# Patient Record
Sex: FEMALE | Race: WHITE | Employment: UNEMPLOYED | URBAN - METROPOLITAN AREA
[De-identification: names, ages, dates, MRNs, and addresses within clinical notes are randomized per-mention and may not be internally consistent; named-entity substitution may affect disease eponyms.]

---

## 2020-10-05 ENCOUNTER — OFFICE VISIT (OUTPATIENT)
Dept: FAMILY MEDICINE CLINIC | Age: 40
End: 2020-10-05
Payer: COMMERCIAL

## 2020-10-05 VITALS
RESPIRATION RATE: 12 BRPM | OXYGEN SATURATION: 97 % | HEIGHT: 65 IN | WEIGHT: 166 LBS | BODY MASS INDEX: 27.66 KG/M2 | HEART RATE: 79 BPM

## 2020-10-05 PROBLEM — I95.9 HYPOTENSION: Status: ACTIVE | Noted: 2020-10-05

## 2020-10-05 PROBLEM — R76.8 HCV ANTIBODY POSITIVE: Status: ACTIVE | Noted: 2020-10-05

## 2020-10-05 PROBLEM — K21.9 GASTROESOPHAGEAL REFLUX DISEASE: Status: ACTIVE | Noted: 2020-10-05

## 2020-10-05 PROBLEM — L81.9 HYPOPIGMENTATION: Status: ACTIVE | Noted: 2020-10-05

## 2020-10-05 PROBLEM — F39 MOOD DISORDER (HCC): Status: ACTIVE | Noted: 2020-10-05

## 2020-10-05 PROBLEM — N92.6 IRREGULAR MENSES: Status: ACTIVE | Noted: 2020-10-05

## 2020-10-05 PROBLEM — N64.4 MASTALGIA: Status: ACTIVE | Noted: 2020-10-05

## 2020-10-05 PROBLEM — F17.210 CIGARETTE NICOTINE DEPENDENCE WITHOUT COMPLICATION: Status: ACTIVE | Noted: 2020-10-05

## 2020-10-05 PROBLEM — Z12.4 SCREENING FOR CERVICAL CANCER: Status: ACTIVE | Noted: 2020-10-05

## 2020-10-05 PROCEDURE — 99204 OFFICE O/P NEW MOD 45 MIN: CPT | Performed by: INTERNAL MEDICINE

## 2020-10-05 RX ORDER — HYDROXYZINE PAMOATE 100 MG/1
100 CAPSULE ORAL EVERY 8 HOURS
COMMUNITY
Start: 2019-12-30

## 2020-10-05 RX ORDER — FAMOTIDINE 20 MG/1
20 TABLET, FILM COATED ORAL 2 TIMES DAILY
Qty: 60 TABLET | Refills: 5 | Status: SHIPPED | OUTPATIENT
Start: 2020-10-05 | End: 2020-10-05 | Stop reason: SDUPTHER

## 2020-10-05 RX ORDER — FAMOTIDINE 20 MG/1
20 TABLET, FILM COATED ORAL 2 TIMES DAILY
COMMUNITY
End: 2020-10-05 | Stop reason: SDUPTHER

## 2020-10-05 RX ORDER — FAMOTIDINE 20 MG/1
20 TABLET, FILM COATED ORAL 2 TIMES DAILY
Qty: 60 TABLET | Refills: 5 | Status: SHIPPED | OUTPATIENT
Start: 2020-10-05 | End: 2021-06-21

## 2020-10-05 RX ORDER — CLONIDINE HYDROCHLORIDE 0.2 MG/1
0.2 TABLET ORAL 2 TIMES DAILY
COMMUNITY

## 2020-10-05 ASSESSMENT — PATIENT HEALTH QUESTIONNAIRE - PHQ9
1. LITTLE INTEREST OR PLEASURE IN DOING THINGS: 0
2. FEELING DOWN, DEPRESSED OR HOPELESS: 0
SUM OF ALL RESPONSES TO PHQ QUESTIONS 1-9: 0
SUM OF ALL RESPONSES TO PHQ9 QUESTIONS 1 & 2: 0
SUM OF ALL RESPONSES TO PHQ QUESTIONS 1-9: 0

## 2020-10-05 NOTE — PATIENT INSTRUCTIONS
I recommend the pneumococcal, TDA P , and flu vaccine. Think about smoking cessation. Decrease clonidine to 0.1 twice daily. May use half a pill. Let your psychiatrist know about your lightheadedness and low blood pressure. Please have your other laboratory sent to us at [phone]. Call for your ultrasound and mammogram of your breast    I am not sure that your rash is tinea versicolor. We will wait and see how your liver function is prior to giving any oral medication. If it does not clear up I would recommend to following with dermatology for this.     You should have your laboratory test results back within a week

## 2020-10-05 NOTE — PROGRESS NOTES
HPI: Mehrdad Burnham presents to establish care    Health issues include diagnosis of bipolar disorder, hypopigmentation, history substance abuse, Suboxone use, hepatitis with positive hep C antibody, irregular menses, elevated BMI, history of bulimia. 2013 on Depakote and elevated liver function test.  Positive hepatitis C antibody however I do not see that she ever had a viral load completed. Has laboratories to have this performed as well as hepatitis a and B surface antibody. Occasional dark stools. No abdominal swelling. No alcohol. No longer on Depakote. No episodes of icterus. History of recreational drugs. Drug addiction. Currently going to the rehab. On Suboxone and tapering. On clonidine in Vistaril. Takes her clonidine intermittently. Took today. Does not like the way it makes her feel. Has lightheadedness and near syncopal episodes on it. Feels a bit lightheaded today. He is safe at home. Has 2 children. Mother lives with them. One child with sensory integration problems and possible autism.  therapeutic  for possible placenta previa  HX abnormal pap,  Irregular menses. No mammograms. Left breast pain and questionable mass. No family history of breast ovarian or uterine cancer. Not sexually active. Denies any concerns with STDs. Does note last time she had sex she had dyspareunia and has had some discharge. Positive tobacco.  Not interested in cessation today. Declines flu and pneumonia vaccines today. General health maintenance. Needs to see dentist.  Enamel loss secondary history of bulimia. Does have an eye doctor. PMH:  Hospitalization , ,    SH lives with mother 2/3 children ( 15,12 ) child with sensory integration problems. + tobacco 1/2 ppd. No alcohol. MJ, recent move. And active. FH: 1 sister   + DM    - colon, breast or ovarian, no known heart disease. Review of systems: Remote concussion.   Substance abuse in remission. Poor dentition. Occasional sinus symptoms. Tobacco addiction. Denies pulmonary function test.  Has had an inhaler remotely none currently. Not interested in smoking cessation currently. Denies any chest pain palpitations. Mastalgia on the left. Positive GE reflux. Some dark stools. No kidney stones recurrent bladder infections. Positive vaginitis. Dyspareunia. History abnormal Pap in the past.  Treated with procedure. Hypopigmentation's chest and left side of her neck being treated with [facility] without improvement.       Constitutional, ent, CV, respiratory, GI, , joint, skin, allergic and psychiatric ROS reviewed and negative except for above    Allergies   Allergen Reactions    Norco [Hydrocodone-Acetaminophen] Other (See Comments)    Tramadol      \"felt weird\"       Outpatient Medications Marked as Taking for the 10/5/20 encounter (Office Visit) with Calvin Garcia MD   Medication Sig Dispense Refill    cloNIDine (CATAPRES) 0.2 MG tablet Take 0.2 mg by mouth 2 times daily      hydrOXYzine (VISTARIL) 100 MG capsule Take 100 mg by mouth every 8 hours      Buprenorphine HCl-Naloxone HCl (SUBOXONE SL) Place under the tongue         Over-the-counter Pepcid in the past      Past Medical History:   Diagnosis Date    Abnormal Pap smear     dysplasia    Anemia     Anxiety     Bipolar 1 disorder (HCC)     Depression     Hepatitis C     Kidney infection     Mental disorder     anxiety,  bipolar disorder    Miscarriage     PID (acute pelvic inflammatory disease)     UTI (lower urinary tract infection)     UTI (lower urinary tract infection)        Past Surgical History:   Procedure Laterality Date     SECTION      ,,     SECTION      DILATION AND CURETTAGE OF UTERUS      INDUCED                Family History   Problem Relation Age of Onset    Arthritis Mother     Diabetes Father     High Blood Pressure Father     Diabetes Maternal found for: LABA1C  No results found for: EAG  No results found for: LABA1C  No components found for: CHLPL  No results found for: TRIG  No results found for: HDL  No results found for: LDLCALC  No results found for: LABVLDL    Old labs and records reviewed or requested  Discussed past lab and studies with patient      Diagnosis Orders   1. Hypotension, unspecified hypotension type     2. Screening, lipid  Lipid Panel   3. Screening for cervical cancer  PAP SMEAR   4. Encounter for vitamin deficiency screening  Vitamin B12   5. Irregular menses  Vaginal Pathogens Probes *A    C.trachomatis N.gonorrhoeae DNA, Thin Prep    TSH WITH REFLEX TO FT4   6. Cigarette nicotine dependence without complication     7. Hypopigmentation     8. HCV antibody positive     9. Mood disorder ([address]     10. Mastalgia  HOSSEIN DIGITAL DIAGNOSTIC W OR WO CAD BILATERAL    US BREAST COMPLETE LEFT   11. Gastroesophageal reflux disease, unspecified whether esophagitis present  BLOOD OCCULT STOOL #1     Pretension most likely from clonidine. Orthostatic changes when she stands up at home. We will cut back her clonidine. May continue on with her his Vistaril. History abnormal Pap irregular menses. STD vaginal pathogens and stool Pap smear, thyroid function. Consider pelvic ultrasound. History of B12 use. States she has poor diet. Will get B12 level. Tobacco addiction with history of asthma. Advised against continuation of tobacco    Elevated liver functions with positive hepatitis C no viral load. This is been ordered as well as her immunity to hep a and B    Mastalgia. Ultrasound diagnosed as mammogram    Substance abuse in remission on Suboxone. Continue on with her specialty care    Mood disorder. Continue on with her Vistaril. Possibility of referral to psych PC when she leaves her current psychiatric practice. She needs to use them secondary to court order    Dark stool. Will need to send her a FIT card GE reflux.   Pepcid prescription given      No follow-ups on file. Diagnosis and treatment discussed.   Possible side effects of medication reviewed  Patients questions answered  Follow up understood  Pt aware if they are not contacted about any test results , this does not mean they are normal.  They should call

## 2020-10-06 LAB
C. TRACHOMATIS DNA,THIN PREP: NEGATIVE
CANDIDA SPECIES, DNA PROBE: ABNORMAL
GARDNERELLA VAGINALIS, DNA PROBE: ABNORMAL
N. GONORRHOEAE DNA, THIN PREP: NEGATIVE
TRICHOMONAS VAGINALIS DNA: ABNORMAL

## 2020-10-07 LAB
HPV COMMENT: NORMAL
HPV TYPE 16: NOT DETECTED
HPV TYPE 18: NOT DETECTED
HPVOH (OTHER TYPES): NOT DETECTED

## 2020-10-11 RX ORDER — METRONIDAZOLE 500 MG/1
500 TABLET ORAL 2 TIMES DAILY
Qty: 14 TABLET | Refills: 0 | Status: SHIPPED | OUTPATIENT
Start: 2020-10-11 | End: 2020-10-18

## 2020-10-14 LAB
A/G RATIO: NORMAL
ALBUMIN SERPL-MCNC: 4.8 G/DL
ALP BLD-CCNC: 64 U/L
ALT SERPL-CCNC: 16 U/L
AST SERPL-CCNC: 21 U/L
BILIRUB SERPL-MCNC: 0.3 MG/DL (ref 0.1–1.4)
BILIRUBIN DIRECT: 0.1 MG/DL
BILIRUBIN, INDIRECT: NORMAL
BUN BLDV-MCNC: 12 MG/DL
CALCIUM SERPL-MCNC: 9.9 MG/DL
CHLORIDE BLD-SCNC: 102 MMOL/L
CHOLESTEROL, TOTAL: 204 MG/DL
CHOLESTEROL/HDL RATIO: NORMAL
CO2: 21 MMOL/L
CREAT SERPL-MCNC: 0.91 MG/DL
GFR CALCULATED: NORMAL
GLOBULIN: NORMAL
GLUCOSE BLD-MCNC: 98 MG/DL
HDLC SERPL-MCNC: 38 MG/DL (ref 35–70)
LDL CHOLESTEROL CALCULATED: 108 MG/DL (ref 0–160)
NONHDLC SERPL-MCNC: NORMAL MG/DL
POTASSIUM SERPL-SCNC: 4.8 MMOL/L
PROTEIN TOTAL: 7.9 G/DL
SODIUM BLD-SCNC: 138 MMOL/L
TRIGL SERPL-MCNC: 338 MG/DL
VLDLC SERPL CALC-MCNC: 58 MG/DL

## 2020-10-20 ENCOUNTER — TELEPHONE (OUTPATIENT)
Dept: FAMILY MEDICINE CLINIC | Age: 40
End: 2020-10-20

## 2020-10-20 NOTE — TELEPHONE ENCOUNTER
PT called in regarding her lab results. Informed PT of Dr. Kimberlyn Quiñonez note and that medication was sent to her pharmacy.

## 2020-11-04 PROBLEM — Z12.4 SCREENING FOR CERVICAL CANCER: Status: RESOLVED | Noted: 2020-10-05 | Resolved: 2020-11-04

## 2020-11-12 ENCOUNTER — TELEPHONE (OUTPATIENT)
Dept: FAMILY MEDICINE CLINIC | Age: 40
End: 2020-11-12

## 2020-11-12 NOTE — TELEPHONE ENCOUNTER
PT called in wanting to know if Dr. Anton Pina received the results of her liver test because Dr. Anton Pina was supposed to call in a medication for her based on those results.      Please call PT back: [phone]

## 2020-11-23 ENCOUNTER — HOSPITAL ENCOUNTER (OUTPATIENT)
Dept: ULTRASOUND IMAGING | Age: 40
Discharge: HOME | End: 2020-11-23
Payer: COMMERCIAL

## 2020-11-23 ENCOUNTER — HOSPITAL ENCOUNTER (OUTPATIENT)
Dept: MAMMOGRAPHY | Age: 40
Discharge: HOME | End: 2020-11-23
Payer: COMMERCIAL

## 2020-11-23 PROCEDURE — G0279 TOMOSYNTHESIS, MAMMO: HCPCS

## 2020-11-23 PROCEDURE — 76642 ULTRASOUND BREAST LIMITED: CPT

## 2020-11-25 ENCOUNTER — TELEPHONE (OUTPATIENT)
Dept: FAMILY MEDICINE CLINIC | Age: 40
End: 2020-11-25

## 2020-11-25 NOTE — TELEPHONE ENCOUNTER
Form was completed and scanned from lab [facility] for them to release other results. Per lab riri another physician ordered the liver tests and had to complete before they sent. I have faxed back to them and will await results. Pt is ready for medication to be rx'd.

## 2020-11-25 NOTE — TELEPHONE ENCOUNTER
Calling to find out if liver results were ever received so she can begin medication. Please advise.  Please call pt back at [phone]

## 2020-11-30 RX ORDER — FLUCONAZOLE 150 MG/1
TABLET ORAL
Qty: 6 TABLET | Refills: 0 | Status: SHIPPED | OUTPATIENT
Start: 2020-11-30 | End: 2021-01-25

## 2020-11-30 NOTE — TELEPHONE ENCOUNTER
Liver tests good. Can start medication ( I've forgotten which med.   I believe for fungus on skin?)  No imunity to hep B recommend vaccination  Neg hep c great

## 2021-04-20 ENCOUNTER — TELEPHONE (OUTPATIENT)
Dept: FAMILY MEDICINE CLINIC | Age: 41
End: 2021-04-20

## 2021-04-20 DIAGNOSIS — L22 DIAPER RASH: Primary | ICD-10-CM

## 2021-04-20 DIAGNOSIS — B36.9 FUNGAL RASH OF TORSO: Primary | ICD-10-CM

## 2021-04-20 DIAGNOSIS — B36.9 FUNGAL RASH OF TRUNK: ICD-10-CM

## 2021-04-20 NOTE — TELEPHONE ENCOUNTER
Pt states it was Dr Hien Sandoval. Pt states that [facility] is not accepting new pt's. Can we try [facility]? Please advise.

## 2021-04-20 NOTE — TELEPHONE ENCOUNTER
Jose hughes, please give her the information. Who gave her this diagnosis? Was this from Dr. Chelo Xiao?

## 2021-04-20 NOTE — TELEPHONE ENCOUNTER
Yes please give her information for the dermatology group, please place new referral and I will sign

## 2021-04-26 PROBLEM — F19.21 DRUG ADDICTION IN REMISSION (HCC): Status: ACTIVE | Noted: 2021-04-26

## 2021-06-21 RX ORDER — FAMOTIDINE 20 MG/1
TABLET, FILM COATED ORAL
Qty: 60 TABLET | Refills: 4 | Status: SHIPPED | OUTPATIENT
Start: 2021-06-21 | End: 2021-09-20 | Stop reason: SDUPTHER

## 2021-08-26 ENCOUNTER — OFFICE VISIT (OUTPATIENT)
Dept: FAMILY MEDICINE CLINIC | Age: 41
End: 2021-08-26
Payer: COMMERCIAL

## 2021-08-26 VITALS
SYSTOLIC BLOOD PRESSURE: 123 MMHG | RESPIRATION RATE: 16 BRPM | DIASTOLIC BLOOD PRESSURE: 83 MMHG | OXYGEN SATURATION: 97 % | HEART RATE: 80 BPM | HEIGHT: 65 IN | WEIGHT: 164 LBS | BODY MASS INDEX: 27.32 KG/M2

## 2021-08-26 DIAGNOSIS — K08.9 DENTAL DISEASE: ICD-10-CM

## 2021-08-26 DIAGNOSIS — R76.8 HCV ANTIBODY POSITIVE: ICD-10-CM

## 2021-08-26 DIAGNOSIS — F19.21 DRUG ADDICTION IN REMISSION (HCC): ICD-10-CM

## 2021-08-26 DIAGNOSIS — H02.9 EYELID ABNORMALITY: ICD-10-CM

## 2021-08-26 DIAGNOSIS — H01.001 BLEPHARITIS OF UPPER EYELIDS OF BOTH EYES, UNSPECIFIED TYPE: ICD-10-CM

## 2021-08-26 DIAGNOSIS — R10.13 EPIGASTRIC PAIN: Primary | ICD-10-CM

## 2021-08-26 DIAGNOSIS — H01.004 BLEPHARITIS OF UPPER EYELIDS OF BOTH EYES, UNSPECIFIED TYPE: ICD-10-CM

## 2021-08-26 PROBLEM — F17.210 CIGARETTE NICOTINE DEPENDENCE WITHOUT COMPLICATION: Status: RESOLVED | Noted: 2020-10-05 | Resolved: 2021-08-26

## 2021-08-26 PROBLEM — I95.9 HYPOTENSION: Status: RESOLVED | Noted: 2020-10-05 | Resolved: 2021-08-26

## 2021-08-26 PROCEDURE — G8419 CALC BMI OUT NRM PARAM NOF/U: HCPCS | Performed by: INTERNAL MEDICINE

## 2021-08-26 PROCEDURE — G8427 DOCREV CUR MEDS BY ELIG CLIN: HCPCS | Performed by: INTERNAL MEDICINE

## 2021-08-26 PROCEDURE — 99214 OFFICE O/P EST MOD 30 MIN: CPT | Performed by: INTERNAL MEDICINE

## 2021-08-26 PROCEDURE — 4004F PT TOBACCO SCREEN RCVD TLK: CPT | Performed by: INTERNAL MEDICINE

## 2021-08-26 RX ORDER — HYOSCYAMINE SULFATE 0.12 MG/1
TABLET SUBLINGUAL
Qty: 60 EACH | Refills: 0 | Status: SHIPPED | OUTPATIENT
Start: 2021-08-26 | End: 2021-09-27

## 2021-08-26 RX ORDER — AMOXICILLIN 875 MG/1
TABLET, COATED ORAL
COMMUNITY
Start: 2021-08-23 | End: 2021-10-25

## 2021-08-26 ASSESSMENT — PATIENT HEALTH QUESTIONNAIRE - PHQ9
2. FEELING DOWN, DEPRESSED OR HOPELESS: 0
SUM OF ALL RESPONSES TO PHQ QUESTIONS 1-9: 0
SUM OF ALL RESPONSES TO PHQ QUESTIONS 1-9: 0
SUM OF ALL RESPONSES TO PHQ9 QUESTIONS 1 & 2: 0
1. LITTLE INTEREST OR PLEASURE IN DOING THINGS: 0
SUM OF ALL RESPONSES TO PHQ QUESTIONS 1-9: 0

## 2021-08-26 NOTE — PATIENT INSTRUCTIONS
Oral And Maxillofacial Surgery  [address] [place], [place], [facility], [address] other location  [phone]    [facility] ([address])  [address], [address] [place] [address]  Phone: [phone]    Call above for oral surgery. I recommend getting Covid vaccine. Please take these laboratory request for your next appointment. Other vaccines that are reasonable include the pneumonia vaccine and tetanus vaccine  Happy birthday.   Nice job not smoking

## 2021-08-26 NOTE — PROGRESS NOTES
HPI: Canelo Phillip presents for abdominal discomfort, dental disease      Health issues include diagnosis of bipolar disorder, hypopigmentation, history substance abuse, Suboxone use, false positive hepatitis C negative viral load, irregular menses, elevated BMI, history of bulimia, dental disease    Over the last year she has had intermittent swelling and hardness of her upper epigastrium. No vomiting associated with this. Does not feel like she is constipated stools does not make it better. Occur several times a week. Has some massage for it. Has not used any laxatives or other medicine for spell. Is unaware of any triggers. Does not feel there is anything that makes it better. Is on H2 blocker chronically. CT abdomens  with normal spleen, pancreas, adrenal glands, kidneys and contracted gallbladder. As of diverticuli and ovarian cyst.  Patient denies constipation. No fevers or chills with this. It has been steady. No weight loss. No sweats      on Depakote and elevated liver function test.  Positive hepatitis C antibody by negative viral load 3/3/2021 negative hepatitis B antigen.      Drug addiction. Currently going to the rehab. On Suboxone and tapering. On clonidine in Vistaril. .  Has 2 children. Is clean currently. Mother lives with them. One child with sensory integration problems and possible autism.      therapeutic  for possible placenta previa  HX abnormal pap,  Irregular menses. No family history of breast ovarian or uterine cancer. Not sexually active. Negative cotesting 2020 I read 1 mammogram 2020     Tobacco cessation. Vapes. No flu, pneumonia, or Covid vaccines. Ental disease. Broken teeth. Enamel loss when she had bulimia. Needs extractions.   Continue to break     Specialists:  subaxone clinic  [facility]       PMH:  Hospitalization , ,     SH lives with mother 2/3 children ( 15,12 ) child with sensory integration problems. 1 vape q 2 1/2 weeks, . No alcohol. MJ,       FH: 1 sister   + DM    - colon, breast or ovarian, no known heart disease.     Review of systems: Remote concussion. Substance abuse in remission. Poor dentition. Occasional sinus symptoms. Tobacco addiction. Denies pulmonary function test.  Has had an inhaler remotely none currently. Not interested in smoking cessation currently. Has a patch. No hemoptysis. Denies any chest pain palpitations. Rare GE reflux. Has constipation. No kidney stones recurrent bladder infections. Positive vaginitis. Dyspareunia. History abnormal Pap in the past.  Treated with procedure.   Hypopigmentation's chest and left side of her neck being treated with Selsun Blue without improvement.     Constitutional, ent, CV, respiratory, GI, , joint, skin, allergic and psychiatric ROS reviewed and negative except for above    Allergies   Allergen Reactions    Norco [Hydrocodone-Acetaminophen] Other (See Comments)    Tramadol      \"felt weird\"       Outpatient Medications Marked as Taking for the 8/26/21 encounter (Office Visit) with Fer Moses MD   Medication Sig Dispense Refill    amoxicillin (AMOXIL) 875 MG tablet       famotidine (PEPCID) 20 MG tablet TAKE ONE TABLET BY MOUTH TWICE A DAY 60 tablet 4    selenium sulfide (SELSUN BLUE) 1 % shampoo Apply topically daily as needed for Itching      cloNIDine (CATAPRES) 0.2 MG tablet Take 0.2 mg by mouth 2 times daily      hydrOXYzine (VISTARIL) 100 MG capsule Take 100 mg by mouth every 8 hours      Buprenorphine HCl-Naloxone HCl (SUBOXONE SL) Place under the tongue               Past Medical History:   Diagnosis Date    Abnormal Pap smear     dysplasia    Anemia     Anxiety     Bipolar 1 disorder (HCC)     Depression     Drug addiction in remission ([facility] [address] 4/26/2021    Hepatitis C     Kidney infection     Mental disorder     anxiety,  bipolar disorder    Miscarriage     PID (acute pelvic Component Value Date    WBC 12.8 (H) 10/28/2014    HGB 14.5 10/28/2014    HCT 43.1 10/28/2014    MCV 90.9 10/28/2014     10/28/2014     No results found for: LABA1C  No results found for: EAG  No results found for: LABA1C  No components found for: CHLPL  Lab Results   Component Value Date    TRIG 338 10/14/2020     Lab Results   Component Value Date    HDL 38 10/14/2020     Lab Results   Component Value Date    LDLCALC 108 10/14/2020     No results found for: LABVLDL    Old labs and records reviewed or requested  Discussed past lab and studies with patient    Diagnosis Orders   1. Epigastric pain  CBC Auto Differential    Comprehensive Metabolic Panel   2. HCV antibody positive     3. Drug addiction in remission ([facility] [address] [address]     4. Eyelid abnormality  Ambulatory referral to Plastic Surgery   5. Dental disease     6. Blepharitis of upper eyelids of both eyes, unspecified type       Epigastric pain some bloating questionable irritable bowel. Trial of Levsin. Laboratories requested. She wishes to have these done at her Suboxone clinic she was given copy of labs to take. Dental disease. Given names of. Oral surgery is to call. Eyelid drooping and inflammation of the eye lashes. Follow-up plastic surgery for possible eyelid surgery and is it is impairing her field of vision. May use washes for blepharitis        No follow-ups on file. Diagnosis and treatment discussed.   Possible side effects of medication reviewed  Patients questions answered  Follow up understood  Pt aware if they are not contacted about any test results , this does not mean they are normal.  They should call

## 2021-08-31 ENCOUNTER — TELEPHONE (OUTPATIENT)
Dept: FAMILY MEDICINE CLINIC | Age: 41
End: 2021-08-31

## 2021-08-31 NOTE — TELEPHONE ENCOUNTER
I spoke with patient regarding appointment no show and informed patient of late cancellation/no show policy. She states she does her best to make her appointments and understands how important this is. She states sometimes she has transportation issues which can cause her to have to reschedule her appointments. Dr. Gerardo Knapp and Ehsan Reasons - The patient would like to know if she has hepatitis? She states she was reviewing her test results and she is interpreting a positive result as she has hepatitis, but states she was informed by Dr. Gerardo Knapp she does not have hepatitis. Can you reach out to the patient to clarify this test result/status for her? Thank you.

## 2021-09-20 ENCOUNTER — TELEPHONE (OUTPATIENT)
Dept: FAMILY MEDICINE CLINIC | Age: 41
End: 2021-09-20

## 2021-09-20 RX ORDER — MELOXICAM 7.5 MG/1
TABLET ORAL
Qty: 60 TABLET | Refills: 0 | Status: SHIPPED | OUTPATIENT
Start: 2021-09-20 | End: 2021-10-18

## 2021-09-20 RX ORDER — FAMOTIDINE 20 MG/1
TABLET, FILM COATED ORAL
Qty: 60 TABLET | Refills: 5 | Status: SHIPPED | OUTPATIENT
Start: 2021-09-20 | End: 2022-07-18

## 2021-09-20 NOTE — TELEPHONE ENCOUNTER
Requesting refill on generic pepcid. Also was wanting refill on ibuprofen, but wasn't sure if you were the prescribing doctor.  Please call into [facility] [phone]

## 2021-09-20 NOTE — TELEPHONE ENCOUNTER
I have refilled pepcid, but don't recommend motrin. Alternative that I would prescribe would be mobic as not as difficult on stomach.   Will order if she agrees

## 2021-09-30 ENCOUNTER — TELEPHONE (OUTPATIENT)
Dept: FAMILY MEDICINE CLINIC | Age: 41
End: 2021-09-30

## 2021-09-30 NOTE — TELEPHONE ENCOUNTER
Liliane from [address] [place] [address] called in asking to have a call back from Dr. Sonali Rivera in regards to Lab orders she believes should be done on Pt. Fidelina direct line is [phone].

## 2021-10-18 RX ORDER — MELOXICAM 7.5 MG/1
TABLET ORAL
Qty: 60 TABLET | Refills: 2 | Status: SHIPPED | OUTPATIENT
Start: 2021-10-18 | End: 2021-10-25

## 2021-10-25 ENCOUNTER — OFFICE VISIT (OUTPATIENT)
Dept: FAMILY MEDICINE CLINIC | Age: 41
End: 2021-10-25
Payer: COMMERCIAL

## 2021-10-25 ENCOUNTER — OFFICE VISIT (OUTPATIENT)
Dept: SURGERY | Age: 41
End: 2021-10-25
Payer: COMMERCIAL

## 2021-10-25 VITALS
DIASTOLIC BLOOD PRESSURE: 72 MMHG | HEART RATE: 64 BPM | HEIGHT: 65 IN | BODY MASS INDEX: 27.82 KG/M2 | WEIGHT: 167 LBS | TEMPERATURE: 98.4 F | SYSTOLIC BLOOD PRESSURE: 105 MMHG

## 2021-10-25 VITALS
BODY MASS INDEX: 27.82 KG/M2 | SYSTOLIC BLOOD PRESSURE: 91 MMHG | OXYGEN SATURATION: 98 % | HEART RATE: 56 BPM | HEIGHT: 65 IN | WEIGHT: 167 LBS | DIASTOLIC BLOOD PRESSURE: 69 MMHG | RESPIRATION RATE: 12 BRPM

## 2021-10-25 DIAGNOSIS — L81.9 HYPOPIGMENTATION: ICD-10-CM

## 2021-10-25 DIAGNOSIS — H02.831 DERMATOCHALASIS OF BOTH UPPER EYELIDS: Primary | ICD-10-CM

## 2021-10-25 DIAGNOSIS — Z23 NEED FOR INFLUENZA VACCINATION: ICD-10-CM

## 2021-10-25 DIAGNOSIS — L30.9 DERMATITIS: Primary | ICD-10-CM

## 2021-10-25 DIAGNOSIS — R60.9 EDEMA, UNSPECIFIED TYPE: ICD-10-CM

## 2021-10-25 DIAGNOSIS — H02.834 DERMATOCHALASIS OF BOTH UPPER EYELIDS: Primary | ICD-10-CM

## 2021-10-25 DIAGNOSIS — N92.6 MENSTRUAL DISORDER: ICD-10-CM

## 2021-10-25 PROCEDURE — 90674 CCIIV4 VAC NO PRSV 0.5 ML IM: CPT | Performed by: INTERNAL MEDICINE

## 2021-10-25 PROCEDURE — 99214 OFFICE O/P EST MOD 30 MIN: CPT | Performed by: INTERNAL MEDICINE

## 2021-10-25 PROCEDURE — G8419 CALC BMI OUT NRM PARAM NOF/U: HCPCS | Performed by: SURGERY

## 2021-10-25 PROCEDURE — 4004F PT TOBACCO SCREEN RCVD TLK: CPT | Performed by: SURGERY

## 2021-10-25 PROCEDURE — G8427 DOCREV CUR MEDS BY ELIG CLIN: HCPCS | Performed by: SURGERY

## 2021-10-25 PROCEDURE — 4004F PT TOBACCO SCREEN RCVD TLK: CPT | Performed by: INTERNAL MEDICINE

## 2021-10-25 PROCEDURE — G8419 CALC BMI OUT NRM PARAM NOF/U: HCPCS | Performed by: INTERNAL MEDICINE

## 2021-10-25 PROCEDURE — G8482 FLU IMMUNIZE ORDER/ADMIN: HCPCS | Performed by: INTERNAL MEDICINE

## 2021-10-25 PROCEDURE — G8427 DOCREV CUR MEDS BY ELIG CLIN: HCPCS | Performed by: INTERNAL MEDICINE

## 2021-10-25 PROCEDURE — G8482 FLU IMMUNIZE ORDER/ADMIN: HCPCS | Performed by: SURGERY

## 2021-10-25 PROCEDURE — 99204 OFFICE O/P NEW MOD 45 MIN: CPT | Performed by: SURGERY

## 2021-10-25 RX ORDER — FLUCONAZOLE 150 MG/1
TABLET ORAL
Qty: 6 TABLET | Refills: 0 | Status: SHIPPED | OUTPATIENT
Start: 2021-10-25 | End: 2022-03-28

## 2021-10-25 NOTE — PATIENT INSTRUCTIONS
[address] [address]  Ph     Call for dermatology  If not resolved in 3 weeks, I think this is not a fungus     Call for gyn apt     We should have your labs back within 6 days  Continue fluid    I recommend covid, pneumonia vaccine if you have not had.

## 2021-10-25 NOTE — Clinical Note
Super nice young woman, will refer to [facility] as she will need visual field testing to confirm diagnosis for insurance. If she wants all of her care there, not a problem. Available if she would like to come back as well. Thanks!  Manuel Sales

## 2021-10-25 NOTE — PROGRESS NOTES
HPI: Duyen Adam presents for hypopigmentation.     Health issues include diagnosis of bipolar disorder, hypopigmentation, history substance abuse, Suboxone use, false positive hepatitis C negative viral load, irregular menses, elevated BMI, history of bulimia, dental disease    Hypopigmentation left side of the neck and chest.  Some progression over extremities. Was on Diflucan and [facility] with some improvement. Has not had a biopsy. No known thyroid disease or B12 deficiency. No family history of vitiligo. Is interested in completing course for tinea versicolor as she was unable to follow through with previously    Elevated liver function test  on Depakote. False positive hepatitis C negative hepatitis B. Immunity to a but not to be. Repeat liver function test are normal.    Drug addiction in remission. Rehab. Suboxone. Clonidine and Vistaril. 1 child with autism. Has a . Notes irregular menses. Some menorrhagia. . Therapeutic portion for placenta previa in the past.  Remotely abnormal Pap. Irregular menses. Positive BV. No intercourse over 2 months. No family history of GYN cancers. Negative cotesting . BI-RADS mammogram 2020 of 1        Tobacco cessation. Vapes. No flu, pneumonia, or Covid vaccines.     dental disease. Broken teeth. Enamel loss when she had bulimia. Needs extractions. 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 Has not yet had visual field testing. Has seen plastic surgery         Specialists:  subaxone clinic  [facility]        PMH:  Hospitalization , ,     SH lives with mother 2/3 children ( 15,12 ) child with sensory integration problems. 1 vape letter alcohol     FH: 1 sister   + DM, substance abuse    - colon, breast or ovarian, no known heart disease.     Review of systems: Remote concussion.  Substance abuse in remission.  Poor dentition.   Bulimia occasional sinus symptoms.   Tobacco addiction.  Denies pulmonary function test.  Has had an inhaler remotely none currently.  Not interested in smoking cessation currently. Has a patch. No hemoptysis. Denies any chest pain palpitations. Rare GE reflux. Has constipation. No kidney stones recurrent bladder infections.  Positive vaginitis.  Dyspareunia.  History abnormal Pap in the past.  Treated with procedure.  Hypopigmentation's chest and left side of her neck being treated with Selsun Blue without improvement.     Constitutional, ent, CV, respiratory, GI, , joint, skin, allergic and psychiatric ROS reviewed and negative except for above    Allergies   Allergen Reactions    Norco [Hydrocodone-Acetaminophen] Other (See Comments)    Tramadol      \"felt weird\"       Outpatient Medications Marked as Taking for the 10/25/21 encounter (Office Visit) with Chloe Rivera MD   Medication Sig Dispense Refill    hyoscyamine (LEVSIN/SL) 125 MCG sublingual tablet DISSOLVE ONE TABLET UNDER THE TONGUE THREE TIMES A DAY FOR ABDOMINAL SPASM 60 tablet 1    cloNIDine (CATAPRES) 0.2 MG tablet Take 0.2 mg by mouth 2 times daily      hydrOXYzine (VISTARIL) 100 MG capsule Take 100 mg by mouth every 8 hours      Buprenorphine HCl-Naloxone HCl (SUBOXONE SL) Place under the tongue       Self discontinued [facility] psychiatry        Past Medical History:   Diagnosis Date    Abnormal Pap smear     dysplasia    Anemia     Anxiety     Bipolar 1 disorder (HCC)     Depression     Drug addiction in remission ([facility] [address] 2021    Hepatitis C     Kidney infection     Mental disorder     anxiety,  bipolar disorder    Miscarriage     PID (acute pelvic inflammatory disease)     UTI (lower urinary tract infection)     UTI (lower urinary tract infection)        Past Surgical History:   Procedure Laterality Date     SECTION      ,,     SECTION      DILATION AND CURETTAGE OF UTERUS      INDUCED  Family History   Problem Relation Age of Onset    Arthritis Mother     Diabetes Father     High Blood Pressure Father     Diabetes Maternal Aunt     Diabetes Paternal Aunt     Cancer Paternal Uncle     Cancer Maternal Grandmother     Cancer Maternal Grandfather     Diabetes Paternal Grandmother     Cancer Paternal Grandfather          Objective     BP 91/69   Pulse 56   Resp 12   Ht 5' 5\" (1.651 m)   Wt 167 lb (75.8 kg)   SpO2 98% Comment: RA  BMI 27.79 kg/m²     @LASTSAO2(3)@    Wt Readings from Last 3 Encounters:   10/25/21 167 lb (75.8 kg)   08/26/21 164 lb (74.4 kg)   10/05/20 166 lb (75.3 kg)       Physical Exam     NAD alert and cooperative  HEENT: TMs unremarkable. Throat is clear. Marginal dentition. Mild ptosis. No anterior cervical adenopathy or thyromegaly noted. Lungs are clear no wheezes rales or rhonchi. Cardiovascular exam regular rate and rhythm without any murmur click. No hepatosplenomegaly or epigastric tenderness. Good pulses lower extremities. I done not distal detect any significant lower extremity edema. Dry skin. Hypopigmented macules left neck few on extremities. She is appropriate.   Well-groomed present with her     Chemistry        Component Value Date/Time     10/14/2020 0000    K 4.8 10/14/2020 0000     10/14/2020 0000    CO2 21 10/14/2020 0000    BUN 12 10/14/2020 0000    CREATININE 0.91 10/14/2020 0000        Component Value Date/Time    CALCIUM 9.9 10/14/2020 0000    ALKPHOS 64 10/14/2020 0000    AST 21 10/14/2020 0000    ALT 16 10/14/2020 0000    BILITOT 0.3 10/14/2020 0000            Lab Results   Component Value Date    WBC 12.8 (H) 10/28/2014    HGB 14.5 10/28/2014    HCT 43.1 10/28/2014    MCV 90.9 10/28/2014     10/28/2014     No results found for: LABA1C  No results found for: EAG  No results found for: LABA1C  No components found for: CHLPL  Lab Results   Component Value Date    TRIG 338 10/14/2020     Lab Results   Component Value Date    HDL 38 10/14/2020     Lab Results   Component Value Date    LDLCALC 108 10/14/2020     No results found for: LABVLDL    Old labs and records reviewed or requested  Discussed past lab and studies with patient      Diagnosis Orders   1. Dermatitis     2. Need for influenza vaccination  INFLUENZA, MDCK QUADV, 2 YRS AND OLDER, IM, PF, PREFILL SYR OR SDV, 0.5ML (FLUCELVAX QUADV, PF)   3. Hypopigmentation  Vitamin B12    TSH WITH REFLEX TO FT4   4. Menstrual disorder  AFL - Linh Jett MD, Gynecology, [facility]    CBC   5. Edema, unspecified type  COMPREHENSIVE METABOLIC PANEL     Hypopigmentation questionable tinea versicolor versus vitiligo. B12 thyroid function, basic metabolic profile. I do not believe she has Prashanth's. Furl to dermatology    Need for influenza vaccine which was given. Discussed Covid pneumonia vaccine and its as well as tetanus. Menstrual disorder. Blood count thyroid function referral GYN has had abnormal Pap in the past however most recent cotesting negative. Patient history of lower extremity edema. None of significance today. We will get basic metabolic profile and CBC. On this date I have spent 30 minutes reviewing previous notes, test results, and face to face with the patient discussing the diagnosis and plan          No follow-ups on file. Diagnosis and treatment discussed.   Possible side effects of medication reviewed  Patients questions answered  Follow up understood  Pt aware if they are not contacted about any test results , this does not mean they are normal.  They should call

## 2021-10-25 NOTE — PROGRESS NOTES
[address] [facility]    Consultation requested by: Patrick Milligan MD    CC: Anuel Hammer / tired eyes    HPI: This is a  37 y.o. female who presents to clinic with desirefor upper blepharoplasty. She notes that her vision has been compromised for the past year. Her pertinent ophthalmologic history is as described below. Glasses/Contacts:Yes:  History of dry eyes: No  History of chronic tearing: Yes  History of excessive blinking:Yes  History of itching: Yes  Prior LASIK surgery:No  Prior eye trauma:  No  Glaucoma:  No  Additional eye or periorbital surgery:  No  Thyroid disorder:No   VTE:  No    PMHx:   Past Medical History:   Diagnosis Date    Abnormal Pap smear     dysplasia    Anemia     Anxiety     Bipolar [facility] disorder ([facility]     Depression     [facility]    Hepatitis C     Kidney infection     Mental disorder     anxiety,  bipolar disorder    Miscarriage     PID (acute pelvic inflammatory disease)     UTI (lower urinary tract infection)     UTI (lower urinary tract infection)      PSHx:   Past Surgical History:   Procedure Laterality Date     SECTION      ,,     SECTION      DILATION AND CURETTAGE OF UTERUS      INDUCED        Allergies:    Allergies   Allergen Reactions    Norco [Hydrocodone-Acetaminophen] Other (See Comments)    Tramadol      \"felt weird\"     FHx: Past history of ocular conditions: None    Meds:   Current Outpatient Medications   Medication Sig Dispense Refill    hyoscyamine (LEVSIN/SL) 125 MCG sublingual tablet DISSOLVE ONE TABLET UNDER THE TONGUE THREE TIMES A DAY FOR ABDOMINAL SPASM 60 tablet 1    famotidine (PEPCID) 20 MG tablet TAKE ONE TABLET BY MOUTH TWICE A DAY 60 tablet 5    selenium sulfide (SELSUN BLUE) 1 % shampoo Apply topically daily as needed for Itching      cloNIDine (CATAPRES) 0.2 MG tablet Take 0.2 mg by mouth 2 times daily      hydrOXYzine (VISTARIL) 100 MG capsule Take 100 mg by mouth every 8 hours      Buprenorphine HCl-Naloxone HCl (SUBOXONE SL) Place under the tongue       No current facility-administered medications for this visit. 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)    ROS History obtained from the patient  General ROS: negative  Psychological ROS: negative  Ophthalmic ROS: negative  Neurological ROS: negative  ENT ROS: negative  Allergy and Immunology ROS: negative  Hematological and Lymphatic ROS: negative  Endocrine ROS: negative  Respiratory ROS: negative  Cardiovascular ROS: negative  Gastrointestinal ROS: negative  Genito-Urinary ROS: negative  Musculoskeletal ROS: negative   Skin ROS: negative    EXAM  /72   Pulse 64   Temp 98.4 °F (36.9 °C)   Ht 5' 5\" (1.651 m)   Wt 167 lb (75.8 kg)   BMI 27.79 kg/m²     GEN: NAD  FACE: Deep transverse rhytids Yes; frontalis activation present no; brows positioned  below orbital rim  ORBITAL/EYE: Upper lids  right higher than left; right brow piercing  Lid ptosis  present bilaterally  Snap test: slowly  Tear trough deformity: Yes  Vector: positive   Pupils: PERRLA  Extraocular movement: Intact  Scleral show:  on bilateral eye  Visual acuity: Satisfactory  Bell's phenomenon: Yes    IMP: 37 y.o. female with dermatochalasis who presents for discussion regarding blepharoplasty  PLAN: Would likely benefit from bilateral blepharoplasties. Will need visual field testing, thus will refer to [facility]. If she desires to move forward with blepharoplasty with us, happy to see after visual field testing confirms diagnosis. If she desires to move forward with [facility] for blephs, will be available as needed. A discussion regarding eye anatomy and its intricate relation to brows and the forehead was performed with the patient and family. The pathophysiology of periorbital aging was also performed. Clinical photos were obtained.  Additionally, discussion regarding the risks including, but not limited to: bleeding (potentially requiring transfusion or reoperation), infection, loss of vision, dry eyes, poor cosmetic outcome, scarring, revisional surgery,   VTE (DVT/PE), and death was performed. All questions were answered in a satisfactory manner.      Hema Alfredo MD  [address] [place] [address] Reconstructive Surgery  [phone]  10/25/21

## 2021-10-26 DIAGNOSIS — H02.834 DERMATOCHALASIS OF BOTH UPPER EYELIDS: Primary | ICD-10-CM

## 2021-10-26 DIAGNOSIS — H02.831 DERMATOCHALASIS OF BOTH UPPER EYELIDS: Primary | ICD-10-CM

## 2021-10-28 ENCOUNTER — TELEPHONE (OUTPATIENT)
Dept: FAMILY MEDICINE CLINIC | Age: 41
End: 2021-10-28

## 2021-10-28 DIAGNOSIS — L81.9 HYPOPIGMENTATION: Primary | ICD-10-CM

## 2021-10-28 RX ORDER — KETOCONAZOLE 20 MG/G
CREAM TOPICAL
Qty: 30 G | Refills: 1 | Status: SHIPPED | OUTPATIENT
Start: 2021-10-28 | End: 2022-03-28

## 2021-10-28 NOTE — TELEPHONE ENCOUNTER
Patient ok with keeping 1% shampoo. She was unaware of time frame, she had been using daily in shower. The ketoconazole 2% cream she got from urgent care and says was very helpful, used daily and really wants this. She never saw derm as there was a very long wait time, and another derm office didn't take insurance.

## 2021-10-28 NOTE — TELEPHONE ENCOUNTER
Our record does not show that the 2.25 strength was ever prescribed by Dr. Kunal Taveras. Find out how often she is using the shampoo. The 1% can be used a couple times per week but the 2.25% can only be used every 1-4 weeks. I also do not see that ketoconazole was ever prescribed. Dr. Kunal Taveras note mentioned a derm. Maybe she got these from them? ? She did send in fluconazole tablets. Please call to clarify.

## 2021-10-28 NOTE — TELEPHONE ENCOUNTER
Went to pharm for meds: 1) PA is needed for selenium sulfide. Was originally sent in  2.25% this time is was sent in for 1% strength. Due to strength being different PA is required. Pt is wanting to know if you will call in the higher strength. Trying to aggressively take care of the dandruff issue. Please advise. 2) was going to send in a cream for ketoconazole 2%- pharm stating they did not receive script for this. Please advise. Please contact pharm at [phone].  Pt is reachable at   [phone]    Please address today

## 2021-11-29 ENCOUNTER — TELEPHONE (OUTPATIENT)
Dept: FAMILY MEDICINE CLINIC | Age: 41
End: 2021-11-29

## 2021-11-29 NOTE — TELEPHONE ENCOUNTER
Oral And Maxillofacial Surgery  [address], [place], [address] other location  [phone]     [facility] ([address])  [facility] [address]  Phone: [phone]     Call above for oral surgery.

## 2021-11-29 NOTE — TELEPHONE ENCOUNTER
Needs referral to oral surgeon to have teeth pulled. Had to see a dentist first, which she has done today. Needing to know who you had referred her to see so the referral from the dentist can be sent to the oral surgeon. Please advise.  Pt is reachable at [phone]

## 2022-01-27 ENCOUNTER — TELEPHONE (OUTPATIENT)
Dept: FAMILY MEDICINE CLINIC | Age: 42
End: 2022-01-27

## 2022-01-27 DIAGNOSIS — F41.0 PANIC: Primary | ICD-10-CM

## 2022-01-27 RX ORDER — LORAZEPAM 0.5 MG/1
0.5 TABLET ORAL NIGHTLY PRN
Qty: 3 TABLET | Refills: 0 | Status: SHIPPED | OUTPATIENT
Start: 2022-01-27 | End: 2022-01-30

## 2022-01-27 NOTE — TELEPHONE ENCOUNTER
It doesn't look like Dr Isaac Bennett prescribes her anxiety medications. Is patient established with a psychiatrist or counselor? Might recommend reaching out to them.

## 2022-01-27 NOTE — TELEPHONE ENCOUNTER
Patient informed she should go through who rx's her anxiety medications. She says it takes weeks to get in touch with [facility]. She is requesting that something be written up explaining Dr. Olinda Marshall is out until Monday and that she has tried to contact her regarding this matter to give to her . She wanted another message sent back.

## 2022-01-27 NOTE — TELEPHONE ENCOUNTER
Lise, I have sent a script for 3 lorazepam tabs to pharmacy, our clinic will not be able to refill script, she needs to fu with her mental health team ASAP, if she feels manic, or develops SI/HI, she needs to be evaluated in ER

## 2022-01-27 NOTE — TELEPHONE ENCOUNTER
Yes ok to provide note stating Dr. Yvonne Paez is out of the office and requesting her to be seen by her mental health specialist asap. Please document call and then close encounter.   thanks

## 2022-01-27 NOTE — TELEPHONE ENCOUNTER
I called this patient to discuss. She has history of narcotic dependence and sees a therapist who prescribes her vistaril and clonidine for anxiety. She is having increased panic for the last couple days. Just tested positive for covid. She has reached out to her therapist. Looks like she has been on valium in the past. I cannot prescribe controlled substances. Not sure if you feel its appropriate to give her a short course. I cannot think of anything else to do for her. Let me know your thoughts. Thanks!

## 2022-01-27 NOTE — TELEPHONE ENCOUNTER
Patient is requesting anxiety medications. States she is having anxiety attacks and unable to sleep.

## 2022-02-01 ENCOUNTER — TELEPHONE (OUTPATIENT)
Dept: FAMILY MEDICINE CLINIC | Age: 42
End: 2022-02-01

## 2022-02-01 DIAGNOSIS — L81.9 HYPOPIGMENTATION: ICD-10-CM

## 2022-02-01 RX ORDER — BUSPIRONE HYDROCHLORIDE 10 MG/1
TABLET ORAL
Qty: 9 TABLET | Refills: 0 | Status: SHIPPED | OUTPATIENT
Start: 2022-02-01 | End: 2022-04-25

## 2022-02-01 NOTE — TELEPHONE ENCOUNTER
----- Message from [facility] sent at 2/1/2022 11:30 AM EST -----  Subject: Message to Provider    QUESTIONS  Information for Provider? Pt would like the office to call her back   regarding her current medications.   ---------------------------------------------------------------------------  --------------  CALL BACK INFO  What is the best way for the office to contact you? OK to leave message on   voicemail  Preferred Call Back Phone Number? [phone]  ---------------------------------------------------------------------------  --------------  SCRIPT ANSWERS  Relationship to Patient?  Self

## 2022-02-01 NOTE — TELEPHONE ENCOUNTER
Patient informed. She says she has buspar on hand but stopped taking that a while ago as it didn't work. Let her know the meds you don't rx and recommended she contact psych for additional medication.

## 2022-02-01 NOTE — TELEPHONE ENCOUNTER
I do not prescribe ativan, xanax or valium. Her atarax is an anti anxiety medication be sure she is taking that as her psychiatrist prescribed.   I have ordered 9 buspar,

## 2022-02-01 NOTE — TELEPHONE ENCOUNTER
Spoke with patient, she took last lorazepam and says this worked for her but she is wanting to speak with a doctor for this increased anxiety and insomnia. She reached out to her psych but she says she wont hear anything until the end of the week is very difficult to get in with them.

## 2022-03-17 ENCOUNTER — TELEPHONE (OUTPATIENT)
Dept: FAMILY MEDICINE CLINIC | Age: 42
End: 2022-03-17

## 2022-03-17 DIAGNOSIS — G89.29 CHRONIC RIGHT SHOULDER PAIN: Primary | ICD-10-CM

## 2022-03-17 DIAGNOSIS — M25.511 CHRONIC RIGHT SHOULDER PAIN: Primary | ICD-10-CM

## 2022-03-17 NOTE — TELEPHONE ENCOUNTER
Pt has had rt shoulder pain x 3 months. Wanting to know if you have any recommendations. Please advise.  Pt is reachable at [phone]    Pt has appt on 4/25/22

## 2022-03-17 NOTE — TELEPHONE ENCOUNTER
Patient informed and agrees to orders. Let her know no appt. Needed for the xray. Referral I will mail. She would like ibuprofen called into [facility] as the meloxicam did not work.

## 2022-03-17 NOTE — TELEPHONE ENCOUNTER
Right shoulder, started when patient slept on it the wrong way. Pain went away and then fell on ice. She takes ibuprofen which gives some relief. She says Dr. Deborah Sullivan had her try an alt. Medication to take instead of ibuprofen but did not work. Gave all other recommendations and would let her know I would call back if she wants to call something in.

## 2022-03-18 RX ORDER — IBUPROFEN 800 MG/1
TABLET ORAL
Qty: 60 TABLET | Refills: 5 | Status: SHIPPED | OUTPATIENT
Start: 2022-03-18 | End: 2022-09-17

## 2022-03-28 ENCOUNTER — OFFICE VISIT (OUTPATIENT)
Dept: ORTHOPEDIC SURGERY | Age: 42
End: 2022-03-28
Payer: COMMERCIAL

## 2022-03-28 VITALS — HEIGHT: 65 IN | BODY MASS INDEX: 26.99 KG/M2 | WEIGHT: 162 LBS

## 2022-03-28 DIAGNOSIS — M25.511 ACUTE PAIN OF RIGHT SHOULDER: Primary | ICD-10-CM

## 2022-03-28 DIAGNOSIS — M75.01 ADHESIVE CAPSULITIS OF RIGHT SHOULDER: ICD-10-CM

## 2022-03-28 PROCEDURE — G8427 DOCREV CUR MEDS BY ELIG CLIN: HCPCS | Performed by: ORTHOPAEDIC SURGERY

## 2022-03-28 PROCEDURE — G8419 CALC BMI OUT NRM PARAM NOF/U: HCPCS | Performed by: ORTHOPAEDIC SURGERY

## 2022-03-28 PROCEDURE — 4004F PT TOBACCO SCREEN RCVD TLK: CPT | Performed by: ORTHOPAEDIC SURGERY

## 2022-03-28 PROCEDURE — G8482 FLU IMMUNIZE ORDER/ADMIN: HCPCS | Performed by: ORTHOPAEDIC SURGERY

## 2022-03-28 PROCEDURE — 99203 OFFICE O/P NEW LOW 30 MIN: CPT | Performed by: ORTHOPAEDIC SURGERY

## 2022-03-28 RX ORDER — NALOXONE HYDROCHLORIDE 4 MG/.1ML
SPRAY NASAL
COMMUNITY
Start: 2022-02-25

## 2022-03-28 RX ORDER — CARIPRAZINE 1.5 MG/1
CAPSULE, GELATIN COATED ORAL
COMMUNITY
Start: 2022-02-22 | End: 2022-04-25

## 2022-03-28 RX ORDER — NICOTINE 21 MG/24HR
PATCH, TRANSDERMAL 24 HOURS TRANSDERMAL
COMMUNITY
Start: 2022-02-07

## 2022-03-28 RX ORDER — PREDNISONE 10 MG/1
TABLET ORAL
Qty: 50 TABLET | Refills: 0 | Status: SHIPPED | OUTPATIENT
Start: 2022-03-28 | End: 2022-06-09

## 2022-03-28 NOTE — PROGRESS NOTES
Tim Pickard is seen today for new patient evaluation of right shoulder pain. She had pain for several months. She thinks is from sleeping awkwardly on her right shoulder. She said it felt better after taking a kickboxing class but then became painful again. Pain can be about 5 out of 10. She says it feels very stiff reaching behind her back as well as overhead. She says there are times and feels like it shifts or pops or clicks. She has been taking 800 mg ibuprofen without improvement. Past history significant for drug addiction, reflux, mood disorder. She is a student at [employer]. She is right-hand dominant. History: Patient's relevant past family, medical, and social history are reviewed as part of today's visit. ROS of pertinent positives and negatives as above; otherwise negative. General Exam:    Vitals: Height 5' 5\" (1.651 m), weight 162 lb (73.5 kg). Constitutional: Patient is adequately groomed with no evidence of malnutrition  Mental Status: The patient is oriented to time, place and person. The patient's mood and affect are appropriate. Gait:  Patient walks with normal gait and station. Lymphatic: The lymphatic examination bilaterally reveals all areas to be without enlargement or induration. Vascular: Examination reveals no swelling or calf tenderness. Peripheral pulses are palpable and 2+. Neurological: The patient has good coordination. There is no weakness or sensory deficit. Skin:    Head/Neck: inspection reveals no rashes, ulcerations or lesions. Trunk:  inspection reveals no rashes, ulcerations or lesions. Right Lower Extremity: inspection reveals no rashes, ulcerations or lesions. Left Lower Extremity: inspection reveals no rashes, ulcerations or lesions. Examination of the cervical spine reveals no restriction in motion. There are no reproduction of symptoms into either arm with flexion, extension, rotation or palpation.   The patient has a negative Spurling sign, and no tenderness. Examination of the left shoulder reveals normal scapular control and no prominence. There is no pain over the acromioclavicular or sternoclavicular joints. The patient has no biceps pain. There is full range of motion. There is no pain with impingement testing. There is no pain with Ramirez maneuver. Crosby's maneuver is normal.  There is no pain or apprehension in the abducted externally rotated position. There is no sulcus sign. There is no instability with anterior or posterior stress applied. The patient demonstrates full strength in the supraspinatus, infraspinatus, and subscapularis. Neurologic and vascular examination of the upper extremity  is normal.    Right shoulder is very tight and external rotation only 10 degrees. Internal rotation is limited to the lower lumbar spine. Forward elevation to about 150 degrees. She has full strength complains of pain. I cannot elicit instability. Xrays of the right shoulder were obtained today in the office and interpreted by me : AP in the scapular plane, axillary lateral, and scapular Y. These demonstrate: No acute bony abnormalities. Assessment: Probable right frozen shoulder. Plan: We will start with a tapering course of prednisone followed by therapy. Follow-up with me in 2 months. I explained her this condition and she agrees with this plan. She will start the prednisone tomorrow and she will stop her ibuprofen in the meantime.

## 2022-03-31 ENCOUNTER — HOSPITAL ENCOUNTER (OUTPATIENT)
Dept: PHYSICAL THERAPY | Age: 42
Setting detail: THERAPIES SERIES
Discharge: HOME | End: 2022-03-31
Payer: COMMERCIAL

## 2022-03-31 PROCEDURE — 97110 THERAPEUTIC EXERCISES: CPT

## 2022-03-31 PROCEDURE — 97530 THERAPEUTIC ACTIVITIES: CPT

## 2022-03-31 PROCEDURE — 97161 PT EVAL LOW COMPLEX 20 MIN: CPT

## 2022-03-31 NOTE — PLAN OF CARE
[address] [facility],[address], [place]      Physical Therapy Certification    Dear Referring Practitioner: Dr. Eulalio Doshi,    We had the pleasure of evaluating the following patient for physical therapy services at [address] [place] [address]. A summary of our findings can be found in the initial assessment below. This includes our plan of care. If you have any questions or concerns regarding these findings, please do not hesitate to contact me at the office phone number checked above. Thank you for the referral.       Physician Signature:_______________________________Date:__________________  By signing above (or electronic signature), therapists plan is approved by physician      Patient: Tushar Thomas   : 1980   MRN: [id number]  Referring Physician: Referring Practitioner: Dr. Eulalio Doshi      Evaluation Date: 3/31/2022      Medical Diagnosis Information:  Diagnosis: M75.01 R Shoulder Adhesive capsulitis   Treatment Diagnosis: M25.611 R Shoulder Stiffness; M25.511 R Shoulder Pain; Insurance information: PT Insurance Information: [place]; 30 V[employer] soft max; no auth    Precautions/ Contra-indications: Current smoker - one pack a week / vaping - working on quitting; Anxiety;     C-SSRS Triggered by Intake questionnaire (Past 2 wk assessment):   [x] No, Questionnaire did not trigger screening.   [] Yes, Patient intake triggered further evaluation      [] C-SSRS Screening completed  [] PCP notified via Plan of Care  [] Emergency services notified     Latex Allergy:  [x]NO      []YES  Preferred Language for Healthcare:   [x]English       []other:    SUBJECTIVE: Patient stated complaint: 37year old female presents to PT with right shoulder pain and stiffness. Denies any JUAN CARLOS - thinks maybe it was how she was sleeping with arm awkwardly overhead. She was doing some boxing classes and that seemed to help her pain.  Despite this, pain is still present as well as stiffness. Also reports she fell on ice recently and that may have contributed too. Prior to stiffness, she also reports feeling of shoulder instability and shifting out of place. Has difficulty reaching behind her back and getting dressed. Has constant aching pain that worsens with movement. Pain in shoulder but radiates down biceps and at times all the way to hand. Denies any numbness/tingling. Very fearful of worsening pain or injury shoulder worse. No pain at night time. Relevant Medical History: no previous right shoulder injuries/surgeries besides \"being born with a dislocated shoulder\"; chronic back pain 14 years since epidural from birth of her daughter  Functional Disability Index:  OUTCOME MEASURE DATE SCORE   FOTO SHOULDER 3/31/22 52%              Pain Scale: 1-2/10 at rest, 8/10 at worst  Easing factors: Starting medrol dose pack tomorrow; ibuprofen has helped mildly in the past  Provocative factors: Moving arm overhead, behind back, or behind head; small movements like writing with right arm     Type: [x]Constant   []Intermittent  [x]Radiating []Localized []other:     Numbness/Tingling: Denies    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 Abduction   180 130!     ER at side  At 45  At 90 70  90  95 15  20  25 T7 T2    IR at 90 80 20 T3 T12    Other  (cervical)        Other           3/31/22  Strength L R Comment   Flexion 5 4+ Mild pain   Abduction 5 4 Mild pain   ER 5 4+    IR 5 5    Supraspinatus      Upper Trap      Lower Trap      Mid Trap      Rhomboids      Biceps      Triceps      Horizontal Abduction      Horizontal Adduction      Lats        3/31/22 deferred  Special Tests Results/Comment   JONATAN Vinson Neers    Painful Arc    Drop Arm    ER Lag Sign    Empty Can / Gatha Sables    Cross body adduction    Apprehension/Relocation    Hornblower's    Bicep Load II          Reflexes/Sensation:               [x]Dermatomes/Myotomes intact               []Reflexes equal and normal bilaterally               []Other:     Joint mobility:               []Normal               [x]Hypo: decreased posterior and inferior GH mobility on right shoulder              [x]Hyper: left shoulder is hypermobile; patient also is (+) on Beighton's scale     Palpation: general TTP throughout right shoulder including deltoid, RC insertion at greater tuberosity, biceps tendon, AC joint     Functional Mobility/Transfers: ind     Posture: very guarded on right side; rounded shoulders     Bandages/Dressings/Incisions: n/a     Gait: (include devices/WB status): guarded on right; decreased arm swing     Orthopedic Special Tests: see above. [x] Patient history, allergies, meds reviewed. Medical chart reviewed. See intake form. Review Of Systems (ROS):  [x]Performed Review of systems (Integumentary, CardioPulmonary, Neurological) by intake and observation. Intake form has been scanned into medical record. Patient has been instructed to contact their primary care physician regarding ROS issues if not already being addressed at this time.        Co-morbidities/Complexities (which will affect course of rehabilitation):   []None              Arthritic conditions   []Rheumatoid arthritis (M05.9)  []Osteoarthritis (M19.91)    Cardiovascular conditions   []Hypertension (I10)  []Hyperlipidemia (E78.5)  []Angina pectoris (I20)  []Atherosclerosis (I70)    Musculoskeletal conditions   []Disc pathology   []Congenital spine pathologies   []Prior surgical intervention  []Osteoporosis (M81.8)  []Osteopenia (M85.8)   Endocrine conditions   []Hypothyroid (E03.9)  []Hyperthyroid Gastrointestinal conditions []Constipation (O17.37)    Metabolic conditions   []Morbid obesity (E66.01)  []Diabetes type 1(E10.65) or 2 (E11.65)   []Neuropathy (G60.9)      Pulmonary conditions   []Asthma (J45)  []Coughing   []COPD (J44.9)    Psychological Disorders  []Anxiety (F41.9)  []Depression (F32.9)   []Other:    []Other:            Barriers to/and or personal factors that will affect rehab potential:              []Age  []Sex              []Motivation/Lack of Motivation                        []Co-Morbidities              []Cognitive Function, education/learning barriers              []Environmental, home barriers              []profession/work barriers  []past PT/medical experience  []other:  Justification:      Falls Risk Assessment (30 days):   [x] Falls Risk assessed and no intervention required.   [] Falls Risk assessed and Patient requires intervention due to being higher risk   TUG score (>12s at risk):     [] Falls education provided, including      G-Codes:      OUTCOME MEASURE DATE SCORE   FOTO SHOULDER 3/31/22 52               ASSESSMENT:   Functional Impairments              [x]Noted spinal or UE joint hypomobility              []Noted spinal or UE joint hypermobility              [x]Decreased UE functional ROM              [x]Decreased UE functional strength              []Abnormal reflexes/sensation/myotomal/dermatomal deficits              [x]Decreased RC/scapular/core strength and neuromuscular control              []other:       Functional Activity Limitations (from functional questionnaire and intake)              [x]Reduced ability to tolerate prolonged functional positions              [x]Reduced ability or difficulty with changes of positions or transfers between positions              [x]Reduced ability to maintain good posture and demonstrate good body mechanics with sitting, bending, and lifting              [x] Reduced ability or tolerance with driving and/or computer work              [x]Reduced ability to sleep              [x]Reduced ability to perform lifting, reaching, carrying tasks              [x]Reduced ability to tolerate impact through UE              [x]Reduced ability to reach behind back              [x]Reduced ability to  or hold objects              [x]Reduced ability to throw or toss an object              []other:       Participation Restrictions              [x]Reduced participation in self care activities              [x]Reduced participation in home management activities              [x]Reduced participation in work activities              [x]Reduced participation in social activities. []Reduced participation in sport / recreational activities. Classification:              []Signs/symptoms consistent with post-surgical status including decreased ROM, strength and function.   []Signs/symptoms consistent with joint sprain/strain              [x]Signs/symptoms consistent with shoulder impingement              []Signs/symptoms consistent with shoulder/elbow/wrist tendinopathy              []Signs/symptoms consistent with Rotator cuff tear              []Signs/symptoms consistent with labral tear              []Signs/symptoms consistent with postural dysfunction                         [x]Signs/symptoms consistent with Glenohumeral IR Deficit - <45 degrees              []Signs/symptoms consistent with facet dysfunction of cervical/thoracic spine                         []Signs/symptoms consistent with pathology which may benefit from Dry needling                []other:      Prognosis/Rehab Potential:                                       []Excellent              []Good                 [x]Fair              []Poor     Tolerance of evaluation/treatment:               []Excellent              []Good                 [x]Fair              []Poor     Physical Therapy Evaluation Complexity Justification  [x] A history of present problem with:  [] no personal factors and/or comorbidities that impact the plan of care;  [x]1-2 personal factors and/or comorbidities that impact the plan of care  []3 personal factors and/or comorbidities that impact the plan of care  [x] An examination of body systems using standardized tests and measures addressing any of the following: body structures and functions (impairments), activity limitations, and/or participation restrictions;:  [] a total of 1-2 or more elements   [] a total of 3 or more elements   [x] a total of 4 or more elements   [x] A clinical presentation with:  [x] stable and/or uncomplicated characteristics   [] evolving clinical presentation with changing characteristics  [] unstable and unpredictable characteristics;   [x] Clinical decision making of [x] low, [] moderate, [] high complexity using standardized patient assessment instrument and/or measurable assessment of functional outcome. [x] EVAL (LOW) 71888 (typically 20 minutes face-to-face)  [] EVAL (MOD) 27070 (typically 30 minutes face-to-face)  [] EVAL (HIGH) 05061 (typically 45 minutes face-to-face)  [] RE-EVAL         PLAN:  Frequency/Duration:  2 days per week for 8 Weeks:  INTERVENTIONS:  [x] Therapeutic exercise including: strength training, ROM, for Upper extremity and core   [x]  NMR activation and proprioception for UE, scap and Core   [x] Manual therapy as indicated for shoulder, scapula and spine to include: Dry Needling/IASTM, STM, PROM, Gr I-IV mobilizations, manipulation. [x] Modalities as needed that may include: thermal agents, E-stim, Biofeedback, US, iontophoresis as indicated  [x] Patient education on joint protection, postural re-education, activity modification, progression of HEP. HEP:  Patient instructed on HEP on this date with handout provided and all questions answered. Discussions about how to progress sets/reps/resistance as necessary for fatigue and challenge.  Patient was instructed to contact PT with any questions or concerns about HEP moving forward. Patient verbally stated she/he understood. Access Code: [email or web address]  URL: [email or web address]  Date: 03/31/2022  Prepared by: Kojo Vizcarra    Exercises  Supine Shoulder External Rotation with Dowel - 3 x daily - 7 x weekly - 10 reps - 10 hold  Sleeper Stretch - 3 x daily - 7 x weekly - 10 reps - 10 hold  Seated Shoulder Flexion Towel Slide at Table Top - 3 x daily - 7 x weekly - 10 reps - 10 hold  Seated Shoulder Scaption Slide at Table Top with Forearm in Neutral - 3 x daily - 7 x weekly - 10 reps - 10 hold  Seated Scapular Retraction - 1 x daily - 7 x weekly - 2 sets - 10 reps - 5 hold  Child's Pose Stretch - 1 x daily - 7 x weekly - 10 reps - 10 hold  Cat-Camel - 1 x daily - 7 x weekly - 10 reps - 10 hold       GOALS:   Patient stated goal: Reduce pain in shoulder    [] Progressing: [] Met: [] Not Met: [] Adjusted    Therapist goals for Patient:   Short Term Goals: To be achieved in: 2 weeks  1. Independent in HEP and progression per patient tolerance, in order to prevent re-injury. [] Progressing: [] Met: [] Not Met: [] Adjusted   2. Patient will have a decrease in pain to facilitate improvement in movement, function, and ADLs as indicated by Functional Deficits. [] Progressing: [] Met: [] Not Met: [] Adjusted    Long Term Goals: To be achieved in: 12 weeks  1. Score of 67% or better on FOTO SHOULDER to assist with reaching prior level of function. [] Progressing: [] Met: [] Not Met: [] Adjusted  2. Patient will demonstrate increased AROM to 0-170 flexion and abduciton, 0-90 ER and 0-50 IR or better without pain  to allow for proper joint functioning as indicated by patients Functional Deficits. [] Progressing: [] Met: [] Not Met: [] Adjusted  3. Patient will demonstrate an increase in strength to 5/5 in right shoulder flexion, abduciton, ER and IR to allow for proper functional mobility as indicated by patients Functional Deficits.    [] Progressing: [] Met: [] Not Met: [] Adjusted  4. Patient will return to [place]  functional activities without increased symptoms or restriction. [] Progressing: [] Met: [] Not Met: [] Adjusted  5. Patient will be able to get dressed and reach behind back with <2/10 pain in shoulder (patient specific functional goal)    [] Progressing: [] Met: [] Not Met: [] Adjusted    Electronically signed by:  Kiah Aldana, PT, DPT, OCS    Note: If patient does not return for scheduled/ recommended follow up visits, this note will serve as a discharge from care along with most recent update on progress.

## 2022-03-31 NOTE — FLOWSHEET NOTE
[address] [facility], [place]    Physical Therapy Daily Treatment Note  Date:  3/31/2022    Patient Name:  Joyce Drake    :  1980  MRN: [id number]  Restrictions/Precautions:    Medical/Treatment Diagnosis Information:  · Diagnosis: M75.01 R Shoulder Adhesive capsulitis  · Treatment Diagnosis: M25.611 R Shoulder Stiffness; M25.511 R Shoulder Pain;   Insurance/Certification information:  PT Insurance Information: [place]; 30 VPCY soft max; no auth  Physician Information:  Referring Practitioner: Dr. Venita Penny  Has the plan of care been signed (Y/N):        []  Yes  [x]  No     Date of Patient follow up with Physician: 22      Is this a Progress Report:     []  Yes  [x]  No        If Yes:  Date Range for reporting period:  Beginning: 3/31/22  Ending:    Progress report will be due (10 Rx or 30 days whichever is less): [birth year]/07/82       Recertification will be due (POC Duration  / 90 days whichever is less): 22         Visit # Insurance Allowable Auth Required   1 30 VOCY []  Yes []  No        OUTCOME MEASURE DATE SCORE   FOTO SHOULDER 3/31/22 52%                  Latex Allergy:  [x]NO      []YES  Preferred Language for Healthcare:   [x]English       []other:    Pain level:  2-8/10     SUBJECTIVE:  See eval    OBJECTIVE:       3/31/22  ROM PROM AROM  Comment     L R L R     Flexion     180 150!     Abduction     180 130!     ER at side  At 45  At 90 70  90  95 15  20  25 T7 T2     IR at 90 80 20 T3 T12     Other  (cervical)             Other                 3/31/22  Strength L R Comment   Flexion 5 4+ Mild pain   Abduction 5 4 Mild pain   ER 5 4+     IR 5 5     Supraspinatus         Upper Trap         Lower Trap         Mid Trap         Rhomboids         Biceps         Triceps         Horizontal Abduction         Horizontal Adduction         Lats            3/31/22 deferred  Special Tests Results/Comment   IRRST     Karel Cervantes     Painful Arc     Drop Arm     ER Lag Sign     Empty Can / Kimi's     Champagne [place]     Speeds     OBriens     Cross body adduction     Apprehension/Relocation     Hornblower's     Bicep Load II              Reflexes/Sensation:               [x]? Dermatomes/Myotomes intact               []? Reflexes equal and normal bilaterally               []? Other:     Joint mobility:               []? Normal               [x]? Hypo: decreased posterior and inferior GH mobility on right shoulder              [x]? Hyper: left shoulder is hypermobile; patient also is (+) on Beighton's scale     Palpation: general TTP throughout right shoulder including deltoid, RC insertion at greater tuberosity, biceps tendon, AC joint     Functional Mobility/Transfers: ind     Posture: very guarded on right side; rounded shoulders     Bandages/Dressings/Incisions: n/a     Gait: (include devices/WB status): guarded on right; decreased arm swing     Orthopedic Special Tests: see above. RESTRICTIONS/PRECAUTIONS: Smoker; Anxiety;    Exercises/Interventions:   Exercise/Equipment Resistance/Repetitions Other comments   Stretching/PROM     Cane ER 10x10\" Supine at 45° ABD    Table Slides 10x10\" Flexion  10x10\" Scaption    Wall Slides     OH Cane Flexion     Pulleys     Forward Hangs     Cross Body Stretch     Sleeper Stretch 10x10\" Cues to not let elbow drop down   BBIR          Upper Trap Stretch     Levator Scap Stretch                    Isometrics     Retraction          Weight shift     Flexion     Abduction     External Rotation     Internal Rotation     Biceps     Triceps          PRE's     Flexion     Abduction     Scaption/Full Can     External Rotation     Internal Rotation     Shrugs     Prone Extension     Serratus     Prone T     Prone Y     Biceps     Triceps     Retraction 2x10x5\" holds         Cable Column/Theraband     External Rotation     Internal Rotation     Shrugs     Lats     Ext     Flex     Scapular Retraction     BIC     TRIC     PNF Dynamic Stability     BoW     Body Blade          Manual interventions     [address] [facility] Joint Mobs NPV               Patient Education:   3/31/22: Patient educated about PT diagnosis, prognosis, and plan of care; educated on role of physical therapy; educated on HEP; educated on anatomy of shoulder joint; discussed likely length of PT/rehab; emphasized importance of HEP and frequency of HEP; discussed ice after stretching but heat for relaxation or warm up. HEP:  Patient instructed on HEP on this date with handout provided and all questions answered. Discussions about how to progress sets/reps/resistance as necessary for fatigue and challenge. Patient was instructed to contact PT with any questions or concerns about HEP moving forward. Patient verbally stated she/he understood. Access Code: [email or web address]  URL: [employer]/  Date: 03/31/2022  Prepared by: Kojo Vizcarra    Exercises  Supine Shoulder External Rotation with Dowel - 3 x daily - 7 x weekly - 10 reps - 10 hold  Sleeper Stretch - 3 x daily - 7 x weekly - 10 reps - 10 hold  Seated Shoulder Flexion Towel Slide at Table Top - 3 x daily - 7 x weekly - 10 reps - 10 hold  Seated Shoulder Scaption Slide at Table Top with Forearm in Neutral - 3 x daily - 7 x weekly - 10 reps - 10 hold  Seated Scapular Retraction - 1 x daily - 7 x weekly - 2 sets - 10 reps - 5 hold  Child's Pose Stretch - 1 x daily - 7 x weekly - 10 reps - 10 hold  Cat-Camel - 1 x daily - 7 x weekly - 10 reps - 10 hold    Therapeutic Exercise and NMR EXR  [x] (14824) Provided verbal/tactile cueing for activities related to strengthening, flexibility, endurance, ROM  for improvements in scapular, scapulothoracic and UE control with self care, reaching, carrying, lifting, house/yardwork, driving/computer work.     [x] (22555) Provided verbal/tactile cueing for activities related to improving balance, coordination, kinesthetic sense, posture, motor skill, proprioception  to assist with  scapular, scapulothoracic and UE control with self care, reaching, carrying, lifting, house/yardwork, driving/computer work. Therapeutic Activities:    [x] (40954 or 11966) Provided verbal/tactile cueing for activities related to improving balance, coordination, kinesthetic sense, posture, motor skill, proprioception and motor activation to allow for proper function of scapular, scapulothoracic and UE control with self care, carrying, lifting, driving/computer work.      Home Exercise Program:    [x] (29400) Reviewed/Progressed HEP activities related to strengthening, flexibility, endurance, ROM of scapular, scapulothoracic and UE control with self care, reaching, carrying, lifting, house/yardwork, driving/computer work  [] (61888) Reviewed/Progressed HEP activities related to improving balance, coordination, kinesthetic sense, posture, motor skill, proprioception of scapular, scapulothoracic and UE control with self care, reaching, carrying, lifting, house/yardwork, driving/computer work      Manual Treatments:  PROM / STM / Oscillations-Mobs:  G-I, II, III, IV (PA's, Inf., Post.)  [x] (48042) Provided manual therapy to mobilize soft tissue/joints of cervical/CT, scapular GHJ and UE for the purpose of modulating pain, promoting relaxation,  increasing ROM, reducing/eliminating soft tissue swelling/inflammation/restriction, improving soft tissue extensibility and allowing for proper ROM for normal function with self care, reaching, carrying, lifting, house/yardwork, driving/computer work    Modalities:  CP 15'    Charges:  Timed Code Treatment Minutes: 30   Total Treatment Minutes: 75       [x] EVAL (LOW) 76088 (typically 20 minutes face-to-face)  [] EVAL (MOD) 73447 (typically 30 minutes face-to-face)  [] EVAL (HIGH) 54031 (typically 45 minutes face-to-face)  [] RE-EVAL     [x] MU(48077) x  1   [] IONTO  [] NMR (29257) x     [] VASO  [] Manual (01859) x      [] Other:  [x] TA x   1   [] [facility] (70541)  [] ES(attended) (69402)      [] ES (un) (20681):     GOALS:   Patient stated goal: Reduce pain in shoulder    [] Progressing: [] Met: [] Not Met: [] Adjusted    Therapist goals for Patient:   Short Term Goals: To be achieved in: 2 weeks  1. Independent in HEP and progression per patient tolerance, in order to prevent re-injury. [] Progressing: [] Met: [] Not Met: [] Adjusted   2. Patient will have a decrease in pain to facilitate improvement in movement, function, and ADLs as indicated by Functional Deficits. [] Progressing: [] Met: [] Not Met: [] Adjusted    Long Term Goals: To be achieved in: 12 weeks  1. Score of 67% or better on FOTO SHOULDER to assist with reaching prior level of function. [] Progressing: [] Met: [] Not Met: [] Adjusted  2. Patient will demonstrate increased AROM to 0-170 flexion and abduciton, 0-90 ER and 0-50 IR or better without pain  to allow for proper joint functioning as indicated by patients Functional Deficits. [] Progressing: [] Met: [] Not Met: [] Adjusted  3. Patient will demonstrate an increase in strength to 5/5 in right shoulder flexion, abduciton, ER and IR to allow for proper functional mobility as indicated by patients Functional Deficits. [] Progressing: [] Met: [] Not Met: [] Adjusted  4. Patient will return to [place]  functional activities without increased symptoms or restriction. [] Progressing: [] Met: [] Not Met: [] Adjusted  5. Patient will be able to get dressed and reach behind back with <2/10 pain in shoulder (patient specific functional goal)    [] Progressing: [] Met: [] Not Met: [] Adjusted    Overall Progression Towards Functional goals/ Treatment Progress Update:  [] Patient is progressing as expected towards functional goals listed. [] Progression is slowed due to complexities/Impairments listed. [] Progression has been slowed due to co-morbidities.   [x] Plan just implemented, too soon to assess goals progression <30days   [] Goals require adjustment due to lack of progress  [] Patient is not progressing as expected and requires additional follow up with physician  [] Other    Prognosis for POC: [x] Good [] Fair  [] Poor      Patient requires continued skilled intervention: [x] Yes  [] No    Treatment/Activity Tolerance:  [x] Patient able to complete treatment  [] Patient limited by fatigue  [] Patient limited by pain     [] Patient limited by other medical complications  [] Other:                         PLAN: See eval  [] Continue per plan of care [] Alter current plan (see comments above)  [x] Plan of care initiated [] Hold pending MD visit [] Discharge      Electronically signed by:  Sandra Bauer, PT, DPT, OCS    Note: If patient does not return for scheduled/ recommended follow up visits, this note will serve as a discharge from care along with most recent update on progress.

## 2022-04-06 DIAGNOSIS — M75.01 ADHESIVE CAPSULITIS OF RIGHT SHOULDER: Primary | ICD-10-CM

## 2022-04-07 ENCOUNTER — APPOINTMENT (OUTPATIENT)
Dept: PHYSICAL THERAPY | Age: 42
End: 2022-04-07
Payer: COMMERCIAL

## 2022-04-12 ENCOUNTER — HOSPITAL ENCOUNTER (OUTPATIENT)
Dept: PHYSICAL THERAPY | Age: 42
Setting detail: THERAPIES SERIES
Discharge: HOME | End: 2022-04-12
Payer: COMMERCIAL

## 2022-04-12 PROCEDURE — 97530 THERAPEUTIC ACTIVITIES: CPT

## 2022-04-12 PROCEDURE — 97112 NEUROMUSCULAR REEDUCATION: CPT

## 2022-04-12 PROCEDURE — 97110 THERAPEUTIC EXERCISES: CPT

## 2022-04-12 NOTE — FLOWSHEET NOTE
[address] [facility], [place]    Physical Therapy Daily Treatment Note  Date:  2022    Patient Name:  Diamond Moffett    :  1980  MRN: [id number]  Restrictions/Precautions:    Medical/Treatment Diagnosis Information:  · Diagnosis: M75.01 R Shoulder Adhesive capsulitis  · Treatment Diagnosis: M25.611 R Shoulder Stiffness; M25.511 R Shoulder Pain; Insurance/Certification information:  PT Insurance Information: [place]; 30 VPCY soft max; no auth  Physician Information:  Referring Practitioner: Dr. Liliana Mann  Has the plan of care been signed (Y/N):        []  Yes  [x]  No     Date of Patient follow up with Physician: 22      Is this a Progress Report:     []  Yes  [x]  No        If Yes:  Date Range for reporting period:  Beginning: 3/31/22  Ending:    Progress report will be due (10 Rx or 30 days whichever is less):        Recertification will be due (POC Duration  / 90 days whichever is less): 22         Visit # Insurance Allowable Auth Required   2 30 Fallon Flock []  Yes []  No        OUTCOME MEASURE DATE SCORE   FOTO SHOULDER 3/31/22 52%                  Latex Allergy:  [x]NO      []YES  Preferred Language for Healthcare:   [x]English       []other:    Pain level:  2/10     SUBJECTIVE:  Patient states she has not been completing her HEP as she has two teenagers at home. States her shoulder maybe feels a little better overall, but still unable to clasp bra.      OBJECTIVE:       3/31/22  ROM PROM AROM  Comment     L R L R     Flexion     180 150!     Abduction     180 130!     ER at side  At 45  At 90 70  90  95 15  20  25 T7 T2     IR at 90 80 20 T3 T12     Other  (cervical)             Other                 3/31/22  Strength L R Comment   Flexion 5 4+ Mild pain   Abduction 5 4 Mild pain   ER 5 4+     IR 5 5     Supraspinatus         Upper Trap         Lower Trap         Mid Trap         Rhomboids         Biceps         Triceps         Horizontal Abduction       Horizontal Adduction         Lats            3/31/22 deferred  Special Tests Results/Comment   JONATAN Cervantes     Painful Arc     Drop Arm     ER Lag Sign     Empty Can / Kimi's     Champagne [place]     Speeds     OBriens     Cross body adduction     Apprehension/Relocation     Hornblower's     Bicep Load II              Reflexes/Sensation:               [x]? Dermatomes/Myotomes intact               []? Reflexes equal and normal bilaterally               []? Other:     Joint mobility:               []? Normal               [x]? Hypo: decreased posterior and inferior GH mobility on right shoulder              [x]? Hyper: left shoulder is hypermobile; patient also is (+) on Beighton's scale     Palpation: general TTP throughout right shoulder including deltoid, RC insertion at greater tuberosity, biceps tendon, AC joint     Functional Mobility/Transfers: ind     Posture: very guarded on right side; rounded shoulders     Bandages/Dressings/Incisions: n/a     Gait: (include devices/WB status): guarded on right; decreased arm swing     Orthopedic Special Tests: see above. RESTRICTIONS/PRECAUTIONS: Smoker; Anxiety;      Exercises/Interventions:   Exercise/Equipment Resistance/Repetitions Other comments   Stretching/PROM     Cane ER     Table Slides 10x10\" Flexion  10x10\" Scaption    Wall Slides     OH Cane Flexion     Pulleys 10x10\" Flexion  10x10\" Scaption    Forward Hangs     Cross Body Stretch     Sleeper Stretch Cues to not let elbow drop down   IR Strap stretch 10x10\"         Upper Trap Stretch     Levator Scap Stretch                    Isometrics     Retraction          Weight shift     Flexion     Abduction     External Rotation     Internal Rotation     Biceps     Triceps          PRE's     Flexion     Abduction     Scaption/Full Can     External Rotation     Internal Rotation     Shrugs     Prone Extension     Serratus     Prone T     Prone Y     Biceps     Triceps     Retraction 2x10x5\" holds         Cable Column/Theraband     External Rotation     Internal Rotation     Shrugs     Lats     Ext     Flex     Scapular Retraction     BIC     TRIC     PNF          Dynamic Stability     BoW     Body Blade          Manual interventions     [facility]                Patient Education:   3/31/22: Patient educated about PT diagnosis, prognosis, and plan of care; educated on role of physical therapy; educated on HEP; educated on anatomy of shoulder joint; discussed likely length of PT/rehab; emphasized importance of HEP and frequency of HEP; discussed ice after stretching but heat for relaxation or warm up. HEP:  Patient instructed on HEP on this date with handout provided and all questions answered. Discussions about how to progress sets/reps/resistance as necessary for fatigue and challenge. Patient was instructed to contact PT with any questions or concerns about HEP moving forward. Patient verbally stated she/he understood. Access Code: [email or web address]  URL: [employer]/  Date: 03/31/2022  Prepared by: Kojo Vizcarra    Exercises  Supine Shoulder External Rotation with Dowel - 3 x daily - 7 x weekly - 10 reps - 10 hold  Sleeper Stretch - 3 x daily - 7 x weekly - 10 reps - 10 hold  Seated Shoulder Flexion Towel Slide at Table Top - 3 x daily - 7 x weekly - 10 reps - 10 hold  Seated Shoulder Scaption Slide at Table Top with Forearm in Neutral - 3 x daily - 7 x weekly - 10 reps - 10 hold  Seated Scapular Retraction - 1 x daily - 7 x weekly - 2 sets - 10 reps - 5 hold  Child's Pose Stretch - 1 x daily - 7 x weekly - 10 reps - 10 hold  Cat-Camel - 1 x daily - 7 x weekly - 10 reps - 10 hold      Therapeutic Exercise and NMR EXR  [x] (11964) Provided verbal/tactile cueing for activities related to strengthening, flexibility, endurance, ROM  for improvements in scapular, scapulothoracic and UE control with self care, reaching, carrying, lifting, house/yardwork, driving/computer work.     [x] (91624) Provided verbal/tactile cueing for activities related to improving balance, coordination, kinesthetic sense, posture, motor skill, proprioception  to assist with  scapular, scapulothoracic and UE control with self care, reaching, carrying, lifting, house/yardwork, driving/computer work. Therapeutic Activities:    [x] (09823 or 89503) Provided verbal/tactile cueing for activities related to improving balance, coordination, kinesthetic sense, posture, motor skill, proprioception and motor activation to allow for proper function of scapular, scapulothoracic and UE control with self care, carrying, lifting, driving/computer work.      Home Exercise Program:    [x] (49802) Reviewed/Progressed HEP activities related to strengthening, flexibility, endurance, ROM of scapular, scapulothoracic and UE control with self care, reaching, carrying, lifting, house/yardwork, driving/computer work  [] (40276) Reviewed/Progressed HEP activities related to improving balance, coordination, kinesthetic sense, posture, motor skill, proprioception of scapular, scapulothoracic and UE control with self care, reaching, carrying, lifting, house/yardwork, driving/computer work      Manual Treatments:  PROM / STM / Oscillations-Mobs:  G-I, II, III, IV (PA's, Inf., Post.)  [x] (67948) Provided manual therapy to mobilize soft tissue/joints of cervical/CT, scapular GHJ and UE for the purpose of modulating pain, promoting relaxation,  increasing ROM, reducing/eliminating soft tissue swelling/inflammation/restriction, improving soft tissue extensibility and allowing for proper ROM for normal function with self care, reaching, carrying, lifting, house/yardwork, driving/computer work    Modalities:     Charges:  Timed Code Treatment Minutes: 40   Total Treatment Minutes: 51   1227-118    [] EVAL (LOW) 70311 (typically 20 minutes face-to-face)  [] EVAL (MOD) 74150 (typically 30 minutes face-to-face)  [] EVAL (HIGH) 84077 (typically 45 minutes face-to-face)  [] RE-EVAL     [x] NZ(77020) x  1   [] IONTO  [x] NMR (91782) x     [] VASO  [] Manual (26694) x      [] Other:  [x] TA x   1   [] Mech Traction (50815)  [] ES(attended) (95979)      [] ES (un) (64531):     GOALS:   Patient stated goal: Reduce pain in shoulder    [] Progressing: [] Met: [] Not Met: [] Adjusted    Therapist goals for Patient:   Short Term Goals: To be achieved in: 2 weeks  1. Independent in HEP and progression per patient tolerance, in order to prevent re-injury. [] Progressing: [] Met: [] Not Met: [] Adjusted   2. Patient will have a decrease in pain to facilitate improvement in movement, function, and ADLs as indicated by Functional Deficits. [] Progressing: [] Met: [] Not Met: [] Adjusted    Long Term Goals: To be achieved in: 12 weeks  1. Score of 67% or better on FOTO SHOULDER to assist with reaching prior level of function. [] Progressing: [] Met: [] Not Met: [] Adjusted  2. Patient will demonstrate increased AROM to 0-170 flexion and abduciton, 0-90 ER and 0-50 IR or better without pain  to allow for proper joint functioning as indicated by patients Functional Deficits. [] Progressing: [] Met: [] Not Met: [] Adjusted  3. Patient will demonstrate an increase in strength to 5/5 in right shoulder flexion, abduciton, ER and IR to allow for proper functional mobility as indicated by patients Functional Deficits. [] Progressing: [] Met: [] Not Met: [] Adjusted  4. Patient will return to [place]  functional activities without increased symptoms or restriction. [] Progressing: [] Met: [] Not Met: [] Adjusted  5. Patient will be able to get dressed and reach behind back with <2/10 pain in shoulder (patient specific functional goal)    [] Progressing: [] Met: [] Not Met: [] Adjusted    Overall Progression Towards Functional goals/ Treatment Progress Update:  [] Patient is progressing as expected towards functional goals listed.     [] Progression is slowed due to complexities/Impairments listed. [] Progression has been slowed due to co-morbidities. [x] Plan just implemented, too soon to assess goals progression <30days   [] Goals require adjustment due to lack of progress  [] Patient is not progressing as expected and requires additional follow up with physician  [] Other    Prognosis for POC: [x] Good [] Fair  [] Poor      Patient requires continued skilled intervention: [x] Yes  [] No    Treatment/Activity Tolerance:  [x] Patient able to complete treatment  [] Patient limited by fatigue  [] Patient limited by pain     [] Patient limited by other medical complications  [] Other: Patient educated on the importance of HEP completion and breaking up the program throughout the day instead of completing all in one sitting. Patient demonstrates improved understanding. Patient with improved mobility observed during AAROM flexion and scaption. Patient tolerated the addition of strap IR stretch well without pain. Progress mobility as tolerated. PLAN: See eval  [] Continue per plan of care [] Alter current plan (see comments above)  [x] Plan of care initiated [] Hold pending MD visit [] Discharge      Electronically signed by:  Braden Murray PT, DPT          Note: If patient does not return for scheduled/ recommended follow up visits, this note will serve as a discharge from care along with most recent update on progress.

## 2022-04-14 ENCOUNTER — HOSPITAL ENCOUNTER (OUTPATIENT)
Dept: PHYSICAL THERAPY | Age: 42
Setting detail: THERAPIES SERIES
Discharge: HOME | End: 2022-04-14
Payer: COMMERCIAL

## 2022-04-14 PROCEDURE — 97140 MANUAL THERAPY 1/> REGIONS: CPT | Performed by: PHYSICAL THERAPIST

## 2022-04-14 PROCEDURE — 97110 THERAPEUTIC EXERCISES: CPT | Performed by: PHYSICAL THERAPIST

## 2022-04-14 NOTE — FLOWSHEET NOTE
[address] [facility] [facility], [place]    Physical Therapy Daily Treatment Note  Date:  2022    Patient Name:  Cecil Higginbotham    :  1980  MRN: [id number]  Restrictions/Precautions:    Medical/Treatment Diagnosis Information:  · Diagnosis: M75.01 R Shoulder Adhesive capsulitis  · Treatment Diagnosis: M25.611 R Shoulder Stiffness; M25.511 R Shoulder Pain; Insurance/Certification information:  PT Insurance Information: Stevphen Danny; 30 VPCY soft max; no auth  Physician Information:  Referring Practitioner: Dr. Ashlie Germna  Has the plan of care been signed (Y/N):        []  Yes  [x]  No     Date of Patient follow up with Physician: 22      Is this a Progress Report:     []  Yes  [x]  No        If Yes:  Date Range for reporting period:  Beginning: 3/31/22  Ending:    Progress report will be due (10 Rx or 30 days whichever is less):        Recertification will be due (POC Duration  / 90 days whichever is less): 22         Visit # Insurance Allowable Auth Required   3 30 Bertha Fatima []  Yes []  No        OUTCOME MEASURE DATE SCORE   FOTO SHOULDER 3/31/22 52%                  Latex Allergy:  [x]NO      []YES  Preferred Language for Healthcare:   [x]English       []other:    Pain level: 4 /10 4/14    SUBJECTIVE:  Patient states her shoulder is good. It has not been sore since Tuesday. She states getting dressed is still difficult. She states she is still getting shooting pain down the arm. She states she is still not getting to her HEP.  She states she wish she could sit down and do them, but is unable to.        OBJECTIVE:       3/31/22  ROM PROM AROM  Comment     L R L R     Flexion     180 150!     Abduction     180 130!     ER at side  At 45  At 90 70  90  95 15  20  25 T7 T2     IR at 90 80 20 T3 T12     Other  (cervical)             Other                 3/31/22  Strength L R Comment   Flexion 5 4+ Mild pain   Abduction 5 4 Mild pain   ER 5 4+     IR 5 5     Supraspinatus         Upper Trap         Lower Trap         Mid Trap         Rhomboids         Biceps         Triceps         Horizontal Abduction         Horizontal Adduction         Lats            3/31/22 deferred  Special Tests Results/Comment   BERNADETTEST     Karel Cervantes     Painful Arc     Drop Arm     ER Lag Sign     Empty Can / Kimi's     Champagne [place]ass[place]     Speeds     OBriens     Cross body adduction     Apprehension/Relocation     Hornblower's     Bicep Load II              Reflexes/Sensation:               [x]? Dermatomes/Myotomes intact               []? Reflexes equal and normal bilaterally               []? Other:     Joint mobility:               []? Normal               [x]? Hypo: decreased posterior and inferior GH mobility on right shoulder              [x]? Hyper: left shoulder is hypermobile; patient also is (+) on Beighton's scale     Palpation: general TTP throughout right shoulder including deltoid, RC insertion at greater tuberosity, biceps tendon, AC joint     Functional Mobility/Transfers: ind     Posture: very guarded on right side; rounded shoulders     Bandages/Dressings/Incisions: n/a     Gait: (include devices/WB status): guarded on right; decreased arm swing     Orthopedic Special Tests: see above. RESTRICTIONS/PRECAUTIONS: Smoker; Anxiety;      Exercises/Interventions:   Exercise/Equipment Resistance/Repetitions Other comments   Stretching/PROM     Cane ER 10x10\" Supine at 90° ABD ^4/14   Table Slides     Wall Slides     OH Cane Flexion     Pulleys 10x10\" Flexion  10x10\" Scaption    Forward Hangs     Cross Body Stretch     Sleeper Stretch Cues to not let elbow drop down   IR Strap stretch 10x10\"    Thoracic ext over foam roller 10 sec x 10  Added 4/14        Upper Trap Stretch     Levator Scap Stretch                    Isometrics     Retraction          Weight shift     Flexion     Abduction     External Rotation     Internal Rotation     Biceps     Triceps          PRE's     Flexion Abduction     Scaption/Full Can     External Rotation     Internal Rotation     Shrugs     Prone Extension     Serratus     Prone T     Prone Y     Biceps     Triceps     Retraction 10 sec x 10  Added 4/14        Cable Column/Theraband     External Rotation     Internal Rotation     Shrugs     Lats     Ext     Flex     Scapular Retraction     BIC     TRIC     PNF          Dynamic Stability     BoW     Body Blade          Manual interventions     Suboccipital release with rib 1 mobilizations and upper trap stretching 4'  Grade III-IV Added 4/14   R shoulder stretching - flex, ABD, IR, and ER  6'  Added 4/14         Patient Education:   3/31/22: Patient educated about PT diagnosis, prognosis, and plan of care; educated on role of physical therapy; educated on HEP; educated on anatomy of shoulder joint; discussed likely length of PT/rehab; emphasized importance of HEP and frequency of HEP; discussed ice after stretching but heat for relaxation or warm up. HEP:  Patient instructed on HEP on this date with handout provided and all questions answered. Discussions about how to progress sets/reps/resistance as necessary for fatigue and challenge. Patient was instructed to contact PT with any questions or concerns about HEP moving forward. Patient verbally stated she/he understood. Access Code: [email or web address]  URL: [employer]/  Date: 03/31/2022  Prepared by:  Kojo Vizcarra    Exercises  Supine Shoulder External Rotation with Dowel - 3 x daily - 7 x weekly - 10 reps - 10 hold  Sleeper Stretch - 3 x daily - 7 x weekly - 10 reps - 10 hold  Seated Shoulder Flexion Towel Slide at Table Top - 3 x daily - 7 x weekly - 10 reps - 10 hold  Seated Shoulder Scaption Slide at Table Top with Forearm in Neutral - 3 x daily - 7 x weekly - 10 reps - 10 hold  Seated Scapular Retraction - 1 x daily - 7 x weekly - 2 sets - 10 reps - 5 hold  Child's Pose Stretch - 1 x daily - 7 x weekly - 10 reps - 10 hold  Cat-Camel - 1 x daily - 7 x weekly - 10 reps - 10 hold      Therapeutic Exercise and NMR EXR  [x] (80377) Provided verbal/tactile cueing for activities related to strengthening, flexibility, endurance, ROM  for improvements in scapular, scapulothoracic and UE control with self care, reaching, carrying, lifting, house/yardwork, driving/computer work. [x] (05911) Provided verbal/tactile cueing for activities related to improving balance, coordination, kinesthetic sense, posture, motor skill, proprioception  to assist with  scapular, scapulothoracic and UE control with self care, reaching, carrying, lifting, house/yardwork, driving/computer work. Therapeutic Activities:    [x] (44592 or 10919) Provided verbal/tactile cueing for activities related to improving balance, coordination, kinesthetic sense, posture, motor skill, proprioception and motor activation to allow for proper function of scapular, scapulothoracic and UE control with self care, carrying, lifting, driving/computer work.      Home Exercise Program:    [x] (64649) Reviewed/Progressed HEP activities related to strengthening, flexibility, endurance, ROM of scapular, scapulothoracic and UE control with self care, reaching, carrying, lifting, house/yardwork, driving/computer work  [] (85157) Reviewed/Progressed HEP activities related to improving balance, coordination, kinesthetic sense, posture, motor skill, proprioception of scapular, scapulothoracic and UE control with self care, reaching, carrying, lifting, house/yardwork, driving/computer work      Manual Treatments:  PROM / STM / Oscillations-Mobs:  G-I, II, III, IV (PA's, Inf., Post.)  [x] (19935) Provided manual therapy to mobilize soft tissue/joints of cervical/CT, scapular GHJ and UE for the purpose of modulating pain, promoting relaxation,  increasing ROM, reducing/eliminating soft tissue swelling/inflammation/restriction, improving soft tissue extensibility and allowing for proper ROM for normal function with self care, reaching, carrying, lifting, house/yardwork, driving/computer work    Modalities:  CP 15' 4/14    Charges:  Timed Code Treatment Minutes: 40'    Total Treatment Minutes: 72'         [] EVAL (LOW) 455 1011 (typically 20 minutes face-to-face)  [] EVAL (MOD) 04178 (typically 30 minutes face-to-face)  [] EVAL (HIGH) 56529 (typically 45 minutes face-to-face)  [] RE-EVAL     [x] VF(49142) x 2   [] IONTO  [] NMR (57671) x     [] VASO  [x] Manual (35409) x 1     [] Other:  [] TA x      [] Mech Traction (58739)  [] ES(attended) (04403)      [] ES (un) (19821):     GOALS:   Patient stated goal: Reduce pain in shoulder    [] Progressing: [] Met: [] Not Met: [] Adjusted    Therapist goals for Patient:   Short Term Goals: To be achieved in: 2 weeks  1. Independent in HEP and progression per patient tolerance, in order to prevent re-injury. [] Progressing: [] Met: [] Not Met: [] Adjusted   2. Patient will have a decrease in pain to facilitate improvement in movement, function, and ADLs as indicated by Functional Deficits. [] Progressing: [] Met: [] Not Met: [] Adjusted    Long Term Goals: To be achieved in: 12 weeks  1. Score of 67% or better on FOTO SHOULDER to assist with reaching prior level of function. [] Progressing: [] Met: [] Not Met: [] Adjusted  2. Patient will demonstrate increased AROM to 0-170 flexion and abduciton, 0-90 ER and 0-50 IR or better without pain  to allow for proper joint functioning as indicated by patients Functional Deficits. [] Progressing: [] Met: [] Not Met: [] Adjusted  3. Patient will demonstrate an increase in strength to 5/5 in right shoulder flexion, abduciton, ER and IR to allow for proper functional mobility as indicated by patients Functional Deficits. [] Progressing: [] Met: [] Not Met: [] Adjusted  4. Patient will return to [place]  functional activities without increased symptoms or restriction. [] Progressing: [] Met: [] Not Met: [] Adjusted  5.  Patient will be able to get dressed and reach behind back with <2/10 pain in shoulder (patient specific functional goal)    [] Progressing: [] Met: [] Not Met: [] Adjusted    Overall Progression Towards Functional goals/ Treatment Progress Update:  [] Patient is progressing as expected towards functional goals listed. [] Progression is slowed due to complexities/Impairments listed. [] Progression has been slowed due to co-morbidities. [x] Plan just implemented, too soon to assess goals progression <30days   [] Goals require adjustment due to lack of progress  [] Patient is not progressing as expected and requires additional follow up with physician  [] Other    Prognosis for POC: [x] Good [] Fair  [] Poor      Patient requires continued skilled intervention: [x] Yes  [] No    Treatment/Activity Tolerance:  [x] Patient able to complete treatment  [] Patient limited by fatigue  [] Patient limited by pain     [] Patient limited by other medical complications  [] Other: Patient brendon session well. She brendon the addition of upper trap stretching and rib mobilizations. She brendon the addition of thoracic extension pain free. 4/14                      PLAN: See eval  [] Continue per plan of care [] Alter current plan (see comments above)  [x] Plan of care initiated [] Hold pending MD visit [] Discharge      Electronically signed by:  Janette Falcon, PT, DPT        Note: If patient does not return for scheduled/ recommended follow up visits, this note will serve as a discharge from care along with most recent update on progress.

## 2022-04-19 ENCOUNTER — HOSPITAL ENCOUNTER (OUTPATIENT)
Dept: PHYSICAL THERAPY | Age: 42
Setting detail: THERAPIES SERIES
Discharge: HOME | End: 2022-04-19
Payer: COMMERCIAL

## 2022-04-19 PROCEDURE — 97112 NEUROMUSCULAR REEDUCATION: CPT

## 2022-04-19 PROCEDURE — 97110 THERAPEUTIC EXERCISES: CPT

## 2022-04-19 NOTE — FLOWSHEET NOTE
[address] [facility] [facility], [place]    Physical Therapy Daily Treatment Note  Date:  2022    Patient Name:  Cecil Higginbotham    :  1980  MRN: [id number]  Restrictions/Precautions:    Medical/Treatment Diagnosis Information:  · Diagnosis: M75.01 R Shoulder Adhesive capsulitis  · Treatment Diagnosis: M25.611 R Shoulder Stiffness; M25.511 R Shoulder Pain; Insurance/Certification information:  PT Insurance Information: [id number]; 30 VPCY soft max; no auth  Physician Information:  Referring Practitioner: Dr. Ashlie German  Has the plan of care been signed (Y/N):        []  Yes  [x]  No     Date of Patient follow up with Physician: 22      Is this a Progress Report:     []  Yes  [x]  No        If Yes:  Date Range for reporting period:  Beginning: 3/31/22  Ending:    Progress report will be due (10 Rx or 30 days whichever is less):        Recertification will be due (POC Duration  / 90 days whichever is less): 22         Visit # Insurance Allowable Auth Required    30 VOCY []  Yes []  No        OUTCOME MEASURE DATE SCORE   FOTO SHOULDER 3/31/22 52%                  Latex Allergy:  [x]NO      []YES  Preferred Language for Healthcare:   [x]English       []other:    Pain level: 0/10     SUBJECTIVE:  Patient states she currently has no pain. She felt like she could reach overhead better after the last visit, but that reaching behind her back seemed more difficult. She states she has not been 100% complaint with her medication because she gets busy and forgets to take it.      OBJECTIVE:       22  ROM PROM AROM  Comment     L R L R     Flexion     180 150!     Abduction     180 130!     ER at side  At 45  At 90 70  90  95 15  20  25 T7 T2     IR at 90 80 20 T3 T7     Other  (cervical)             Other                 3/31/22  Strength L R Comment   Flexion 5 4+ Mild pain   Abduction 5 4 Mild pain   ER 5 4+     IR 5 5     Supraspinatus         Upper Trap         Lower Trap         Mid Trap         Rhomboids         Biceps         Triceps         Horizontal Abduction         Horizontal Adduction         Lats            3/31/22 deferred  Special Tests Results/Comment   JONATAN Cervantes     Painful Arc     Drop Arm     ER Lag Sign     Empty Can / Kimi's     Champagne [place]     Speeds     OBriens     Cross body adduction     Apprehension/Relocation     Hornblower's     Bicep Load II              Reflexes/Sensation:               [x]? Dermatomes/Myotomes intact               []? Reflexes equal and normal bilaterally               []? Other:     Joint mobility:               []? Normal               [x]? Hypo: decreased posterior and inferior GH mobility on right shoulder              [x]? Hyper: left shoulder is hypermobile; patient also is (+) on Beighton's scale     Palpation: general TTP throughout right shoulder including deltoid, RC insertion at greater tuberosity, biceps tendon, AC joint     Functional Mobility/Transfers: ind     Posture: very guarded on right side; rounded shoulders     Bandages/Dressings/Incisions: n/a     Gait: (include devices/WB status): guarded on right; decreased arm swing     Orthopedic Special Tests: see above. RESTRICTIONS/PRECAUTIONS: Smoker; Anxiety;      Exercises/Interventions:   Exercise/Equipment Resistance/Repetitions Other comments   Stretching/PROM     Cane ER Table Slides     Wall Slides     OH Cane Flexion     Pulleys 10x10\" Flexion  10x10\" Scaption    Forward Hangs     Cross Body Stretch     Sleeper Stretch 10x10\"Cues to not let elbow drop down   IR Strap stretch 10x10\"    Thoracic ext over foam roller 10 sec x 10  Added 4/14        Upper Trap Stretch     Levator Scap Stretch                    Isometrics     Retraction          Weight shift     Flexion     Abduction     External Rotation     Internal Rotation     Biceps     Triceps          PRE's     Flexion     Abduction 2x10 in sidelying    Scaption/Full Can     External Rotation 3x10 in sidelying     Internal Rotation     Shrugs     Prone Extension     Serratus     Prone T     Prone Y     Biceps     Triceps     Retraction 10 sec x 10  Added 4/14        Cable Column/Theraband     External Rotation     Internal Rotation     Shrugs     Lats     Ext     Flex     Scapular Retraction     BIC     TRIC     PNF          Dynamic Stability     BoW     Body Blade          Manual interventions     Suboccipital release with rib 1 mobilizations and upper trap stretching R shoulder stretching - flex, ABD, IR, and ER        Patient Education:   3/31/22: Patient educated about PT diagnosis, prognosis, and plan of care; educated on role of physical therapy; educated on HEP; educated on anatomy of shoulder joint; discussed likely length of PT/rehab; emphasized importance of HEP and frequency of HEP; discussed ice after stretching but heat for relaxation or warm up. HEP:  Patient instructed on HEP on this date with handout provided and all questions answered. Discussions about how to progress sets/reps/resistance as necessary for fatigue and challenge. Patient was instructed to contact PT with any questions or concerns about HEP moving forward. Patient verbally stated she/he understood. Access Code: [email or web address]  URL: [employer]/  Date: 03/31/2022  Prepared by:  Kojo Vizcarra    Exercises  Supine Shoulder External Rotation with Dowel - 3 x daily - 7 x weekly - 10 reps - 10 hold  Sleeper Stretch - 3 x daily - 7 x weekly - 10 reps - 10 hold  Seated Shoulder Flexion Towel Slide at Table Top - 3 x daily - 7 x weekly - 10 reps - 10 hold  Seated Shoulder Scaption Slide at Table Top with Forearm in Neutral - 3 x daily - 7 x weekly - 10 reps - 10 hold  Seated Scapular Retraction - 1 x daily - 7 x weekly - 2 sets - 10 reps - 5 hold  Child's Pose Stretch - 1 x daily - 7 x weekly - 10 reps - 10 hold  Cat-Camel - 1 x daily - 7 x weekly - 10 reps - 10 hold      Therapeutic Exercise and NMR EXR  [x] (74467) Provided verbal/tactile cueing for activities related to strengthening, flexibility, endurance, ROM  for improvements in scapular, scapulothoracic and UE control with self care, reaching, carrying, lifting, house/yardwork, driving/computer work. [x] (25386) Provided verbal/tactile cueing for activities related to improving balance, coordination, kinesthetic sense, posture, motor skill, proprioception  to assist with  scapular, scapulothoracic and UE control with self care, reaching, carrying, lifting, house/yardwork, driving/computer work. Therapeutic Activities:    [x] (99294 or 95611) Provided verbal/tactile cueing for activities related to improving balance, coordination, kinesthetic sense, posture, motor skill, proprioception and motor activation to allow for proper function of scapular, scapulothoracic and UE control with self care, carrying, lifting, driving/computer work.      Home Exercise Program:    [x] (28317) Reviewed/Progressed HEP activities related to strengthening, flexibility, endurance, ROM of scapular, scapulothoracic and UE control with self care, reaching, carrying, lifting, house/yardwork, driving/computer work  [] (71722) Reviewed/Progressed HEP activities related to improving balance, coordination, kinesthetic sense, posture, motor skill, proprioception of scapular, scapulothoracic and UE control with self care, reaching, carrying, lifting, house/yardwork, driving/computer work      Manual Treatments:  PROM / STM / Oscillations-Mobs:  G-I, II, III, IV (PA's, Inf., Post.)  [x] (41787) Provided manual therapy to mobilize soft tissue/joints of cervical/CT, scapular GHJ and UE for the purpose of modulating pain, promoting relaxation,  increasing ROM, reducing/eliminating soft tissue swelling/inflammation/restriction, improving soft tissue extensibility and allowing for proper ROM for normal function with self care, reaching, carrying, lifting, house/yardwork, driving/computer work    Modalities:       Charges:  Timed Code Treatment Minutes: 43'    Total Treatment Minutes: 43'     [phone]    [] EVAL (LOW) 455 1011 (typically 20 minutes face-to-face)  [] EVAL (MOD) 82022 (typically 30 minutes face-to-face)  [] EVAL (HIGH) 65527 (typically 45 minutes face-to-face)  [] RE-EVAL     [x] XW(10643) x 2   [] IONTO  [x] NMR (01682) x  1   [] VASO  [] Manual (66660) x      [] Other:  [] TA x      [] Mech Traction (35674)  [] ES(attended) (13980)      [] ES (un) (43835):     GOALS:   Patient stated goal: Reduce pain in shoulder    [] Progressing: [] Met: [] Not Met: [] Adjusted    Therapist goals for Patient:   Short Term Goals: To be achieved in: 2 weeks  1. Independent in HEP and progression per patient tolerance, in order to prevent re-injury. [] Progressing: [] Met: [] Not Met: [] Adjusted   2. Patient will have a decrease in pain to facilitate improvement in movement, function, and ADLs as indicated by Functional Deficits. [] Progressing: [] Met: [] Not Met: [] Adjusted    Long Term Goals: To be achieved in: 12 weeks  1. Score of 67% or better on FOTO SHOULDER to assist with reaching prior level of function. [] Progressing: [] Met: [] Not Met: [] Adjusted  2. Patient will demonstrate increased AROM to 0-170 flexion and abduciton, 0-90 ER and 0-50 IR or better without pain  to allow for proper joint functioning as indicated by patients Functional Deficits. [] Progressing: [] Met: [] Not Met: [] Adjusted  3. Patient will demonstrate an increase in strength to 5/5 in right shoulder flexion, abduciton, ER and IR to allow for proper functional mobility as indicated by patients Functional Deficits. [] Progressing: [] Met: [] Not Met: [] Adjusted  4. Patient will return to [place]  functional activities without increased symptoms or restriction. [] Progressing: [] Met: [] Not Met: [] Adjusted  5.  Patient will be able to get dressed and reach behind back with <2/10 pain in shoulder (patient specific functional goal)    [] Progressing: [] Met: [] Not Met: [] Adjusted    Overall Progression Towards Functional goals/ Treatment Progress Update:  [] Patient is progressing as expected towards functional goals listed. [] Progression is slowed due to complexities/Impairments listed. [] Progression has been slowed due to co-morbidities. [x] Plan just implemented, too soon to assess goals progression <30days   [] Goals require adjustment due to lack of progress  [] Patient is not progressing as expected and requires additional follow up with physician  [] Other    Prognosis for POC: [x] Good [] Fair  [] Poor      Patient requires continued skilled intervention: [x] Yes  [] No    Treatment/Activity Tolerance:  [x] Patient able to complete treatment  [] Patient limited by fatigue  [] Patient limited by pain     [] Patient limited by other medical complications  [] Other: Patient with fair tolerance to activities this visit. Required verbal cueing for proper form of HEP activities. Progress posterior capsule stretching as tolerated next visit. PLAN: See eval  [] Continue per plan of care [] Alter current plan (see comments above)  [x] Plan of care initiated [] Hold pending MD visit [] Discharge      Electronically signed by:  Ha Rubio, PT, DPT              Note: If patient does not return for scheduled/ recommended follow up visits, this note will serve as a discharge from care along with most recent update on progress.

## 2022-04-21 ENCOUNTER — HOSPITAL ENCOUNTER (OUTPATIENT)
Dept: PHYSICAL THERAPY | Age: 42
Setting detail: THERAPIES SERIES
Discharge: HOME | End: 2022-04-21
Payer: COMMERCIAL

## 2022-04-21 PROCEDURE — 97112 NEUROMUSCULAR REEDUCATION: CPT

## 2022-04-21 PROCEDURE — 97110 THERAPEUTIC EXERCISES: CPT

## 2022-04-21 NOTE — FLOWSHEET NOTE
[address] [facility], [place]    Physical Therapy Daily Treatment Note  Date:  2022    Patient Name:  Alan Rascon    :  1980  MRN: [id number]  Restrictions/Precautions:    Medical/Treatment Diagnosis Information:  Diagnosis: M75.01 R Shoulder Adhesive capsulitis  Treatment Diagnosis: M25.611 R Shoulder Stiffness; M25.511 R Shoulder Pain; Insurance/Certification information:  PT Insurance Information: Shan List; 30 VPCY soft max; no auth  Physician Information:  Referring Practitioner: Dr. Dick Au  Has the plan of care been signed (Y/N):        []  Yes  [x]  No     Date of Patient follow up with Physician: 22      Is this a Progress Report:     []  Yes  [x]  No        If Yes:  Date Range for reporting period:  Beginning: 3/31/22  Ending:    Progress report will be due (10 Rx or 30 days whichever is less):        Recertification will be due (POC Duration  / 90 days whichever is less): 22         Visit # Insurance Allowable Auth Required    Ashley Brenner []  Yes []  No        OUTCOME MEASURE DATE SCORE   FOTO SHOULDER 3/31/22 52%                  Latex Allergy:  [x]NO      []YES  Preferred Language for Healthcare:   [x]English       []other:    Pain level: 0/10     SUBJECTIVE:  Patient reporting her shoulder feels okay, but that her back has been very stiff. OBJECTIVE:       22  ROM PROM AROM  Comment     L R L R     Flexion     180 150! Abduction     180 130!      ER at side  At 45  At 90 70  90  95 15  20  25 T7 T2     IR at 90 80 20 T3 T7     Other  (cervical)             Other                 3/31/22  Strength L R Comment   Flexion 5 4+ Mild pain   Abduction 5 4 Mild pain   ER 5 4+     IR 5 5     Supraspinatus         Upper Trap         Lower Trap         Mid Trap         Rhomboids         Biceps         Triceps         Horizontal Abduction         Horizontal Adduction         Lats            3/31/22 deferred  Special Tests Results/Comment JONATAN Cervantes     Painful Arc     Drop Arm     ER Lag Sign     Empty Can / Rajni Furl     Cross body adduction     Apprehension/Relocation     Hornblower's     Bicep Load II              Reflexes/Sensation:               [x]Dermatomes/Myotomes intact               []Reflexes equal and normal bilaterally               []Other:     Joint mobility:               []Normal               [x]Hypo: decreased posterior and inferior GH mobility on right shoulder              [x]Hyper: left shoulder is hypermobile; patient also is (+) on Beighton's scale     Palpation: general TTP throughout right shoulder including deltoid, RC insertion at greater tuberosity, biceps tendon, AC joint     Functional Mobility/Transfers: ind     Posture: very guarded on right side; rounded shoulders     Bandages/Dressings/Incisions: n/a     Gait: (include devices/WB status): guarded on right; decreased arm swing     Orthopedic Special Tests: see above. RESTRICTIONS/PRECAUTIONS: Smoker; Anxiety;      Exercises/Interventions:   Exercise/Equipment Resistance/Repetitions Other comments   Stretching/PROM     Cane ER Table Slides    Wall Slides     OH Cane Flexion     Pulleys 10x10\" Flexion  10x10\" Scaption    Forward Hangs     Cross Body Stretch         A/A Extension  10x5\" Added 4/21   Sleeper Stretch 10x10\" Cues to not let elbow drop down   IR Strap stretch 10x10\"    Thoracic ext over foam roller 10 sec x 10  Added 4/14   Open Book  10x5\" R/L  Added 4/21        Upper Trap Stretch     Levator Scap Stretch                    Isometrics     Retraction          Weight shift     Flexion     Abduction     External Rotation     Internal Rotation     Biceps     Triceps          PRE's     Flexion     Abduction 2x10 in sidelying    Scaption/Full Can     External Rotation 2x10 in sidelying     Internal Rotation     Shrugs     Prone Extension     Serratus     Prone T     Prone Y     Biceps Triceps     Retraction 10 sec x 10  Added 4/14        Cable Column/Theraband     External Rotation     Internal Rotation     Shrugs     Lats     Ext     Flex     Scapular Retraction     BIC     TRIC     PNF          Dynamic Stability     BoW     Body Blade          Manual interventions     Suboccipital release with rib 1 mobilizations and upper trap stretching R shoulder stretching - flex, ABD, IR, and ER        Patient Education:   3/31/22: Patient educated about PT diagnosis, prognosis, and plan of care; educated on role of physical therapy; educated on HEP; educated on anatomy of shoulder joint; discussed likely length of PT/rehab; emphasized importance of HEP and frequency of HEP; discussed ice after stretching but heat for relaxation or warm up. HEP:  Patient instructed on HEP on this date with handout provided and all q 4uestions answered. Discussions about how to progress sets/reps/resistance as necessary for fatigue and challenge. Patient was instructed to contact PT with any questions or concerns about HEP moving forward. Patient verbally stated she/he understood. Access Code: [email or web address]  URL: [employer]/  Date: 03/31/2022  Prepared by:  Kojo Vizcarra    Exercises  Supine Shoulder External Rotation with Dowel - 3 x daily - 7 x weekly - 10 reps - 10 hold  Sleeper Stretch - 3 x daily - 7 x weekly - 10 reps - 10 hold  Seated Shoulder Flexion Towel Slide at Table Top - 3 x daily - 7 x weekly - 10 reps - 10 hold  Seated Shoulder Scaption Slide at Table Top with Forearm in Neutral - 3 x daily - 7 x weekly - 10 reps - 10 hold  Seated Scapular Retraction - 1 x daily - 7 x weekly - 2 sets - 10 reps - 5 hold  Child's Pose Stretch - 1 x daily - 7 x weekly - 10 reps - 10 hold  Cat-Camel - 1 x daily - 7 x weekly - 10 reps - 10 hold      Therapeutic Exercise and NMR EXR  [x] (95418) Provided verbal/tactile cueing for activities related to strengthening, flexibility, endurance, ROM  for improvements in scapular, scapulothoracic and UE control with self care, reaching, carrying, lifting, house/yardwork, driving/computer work. [x] (14478) Provided verbal/tactile cueing for activities related to improving balance, coordination, kinesthetic sense, posture, motor skill, proprioception  to assist with  scapular, scapulothoracic and UE control with self care, reaching, carrying, lifting, house/yardwork, driving/computer work. Therapeutic Activities:    [x] (06198 or 75462) Provided verbal/tactile cueing for activities related to improving balance, coordination, kinesthetic sense, posture, motor skill, proprioception and motor activation to allow for proper function of scapular, scapulothoracic and UE control with self care, carrying, lifting, driving/computer work.      Home Exercise Program:    [x] (98174) Reviewed/Progressed HEP activities related to strengthening, flexibility, endurance, ROM of scapular, scapulothoracic and UE control with self care, reaching, carrying, lifting, house/yardwork, driving/computer work  [] (21756) Reviewed/Progressed HEP activities related to improving balance, coordination, kinesthetic sense, posture, motor skill, proprioception of scapular, scapulothoracic and UE control with self care, reaching, carrying, lifting, house/yardwork, driving/computer work      Manual Treatments:  PROM / STM / Oscillations-Mobs:  G-I, II, III, IV (PA's, Inf., Post.)  [x] (64082) Provided manual therapy to mobilize soft tissue/joints of cervical/CT, scapular GHJ and UE for the purpose of modulating pain, promoting relaxation,  increasing ROM, reducing/eliminating soft tissue swelling/inflammation/restriction, improving soft tissue extensibility and allowing for proper ROM for normal function with self care, reaching, carrying, lifting, house/yardwork, driving/computer work    Modalities:       Charges:  Timed Code Treatment Minutes: 50'    Total Treatment Minutes: 50'     420-510    [] OSCAR (LOW) 25094 (typically 20 minutes face-to-face)  [] EVAL (MOD) 51805 (typically 30 minutes face-to-face)  [] EVAL (HIGH) 72406 (typically 45 minutes face-to-face)  [] RE-EVAL     [x] UZ(20189) x 2   [] IONTO  [x] NMR (11184) x  1   [] VASO  [] Manual (33851) x      [] Other:  [] TA x      [] Mech Traction (19020)  [] ES(attended) (50739)      [] ES (un) (06704):     GOALS:   Patient stated goal: Reduce pain in shoulder    [] Progressing: [] Met: [] Not Met: [] Adjusted    Therapist goals for Patient:   Short Term Goals: To be achieved in: 2 weeks  1. Independent in HEP and progression per patient tolerance, in order to prevent re-injury. [] Progressing: [] Met: [] Not Met: [] Adjusted   2. Patient will have a decrease in pain to facilitate improvement in movement, function, and ADLs as indicated by Functional Deficits. [] Progressing: [] Met: [] Not Met: [] Adjusted    Long Term Goals: To be achieved in: 12 weeks  1. Score of 67% or better on FOTO SHOULDER to assist with reaching prior level of function. [] Progressing: [] Met: [] Not Met: [] Adjusted  2. Patient will demonstrate increased AROM to 0-170 flexion and abduciton, 0-90 ER and 0-50 IR or better without pain  to allow for proper joint functioning as indicated by patients Functional Deficits. [] Progressing: [] Met: [] Not Met: [] Adjusted  3. Patient will demonstrate an increase in strength to 5/5 in right shoulder flexion, abduciton, ER and IR to allow for proper functional mobility as indicated by patients Functional Deficits. [] Progressing: [] Met: [] Not Met: [] Adjusted  4. Patient will return to [place]  functional activities without increased symptoms or restriction. [] Progressing: [] Met: [] Not Met: [] Adjusted  5.  Patient will be able to get dressed and reach behind back with <2/10 pain in shoulder (patient specific functional goal)    [] Progressing: [] Met: [] Not Met: [] Adjusted    Overall Progression Towards Functional goals/ Treatment Progress Update:  [] Patient is progressing as expected towards functional goals listed. [] Progression is slowed due to complexities/Impairments listed. [] Progression has been slowed due to co-morbidities. [x] Plan just implemented, too soon to assess goals progression <30days   [] Goals require adjustment due to lack of progress  [] Patient is not progressing as expected and requires additional follow up with physician  [] Other    Prognosis for POC: [x] Good [] Fair  [] Poor      Patient requires continued skilled intervention: [x] Yes  [] No    Treatment/Activity Tolerance:  [x] Patient able to complete treatment  [] Patient limited by fatigue  [] Patient limited by pain     [] Patient limited by other medical complications  [] Other: Patient with fair tolerance to activities this visit. Challenged with thoracic extension d/t paraspinal fatigue. Initiated S/L Open Books and pt reports good relief through thoracic spine and across ant R shoulder. Pt advised to try sleeping with hips supported at 90 degrees as pt reports good relief of LBP when leg was supported on bolster with Open books. Mid back \"lorie pose\" and \"standing L stretch\" printed for HEP compliance. PLAN: See eval; Progress posterior capsule stretche  [x] Continue per plan of care [] Alter current plan (see comments above)  [] Plan of care initiated [] Hold pending MD visit [] Discharge      Electronically signed by:  Delroy Cunningham PTA, [id number]              Note: If patient does not return for scheduled/ recommended follow up visits, this note will serve as a discharge from care along with most recent update on progress.

## 2022-04-25 ENCOUNTER — OFFICE VISIT (OUTPATIENT)
Dept: FAMILY MEDICINE CLINIC | Age: 42
End: 2022-04-25
Payer: COMMERCIAL

## 2022-04-25 VITALS
OXYGEN SATURATION: 100 % | SYSTOLIC BLOOD PRESSURE: 111 MMHG | DIASTOLIC BLOOD PRESSURE: 77 MMHG | WEIGHT: 165 LBS | RESPIRATION RATE: 12 BRPM | HEIGHT: 65 IN | HEART RATE: 75 BPM | BODY MASS INDEX: 27.49 KG/M2

## 2022-04-25 DIAGNOSIS — M25.519 CHRONIC SHOULDER PAIN, UNSPECIFIED LATERALITY: ICD-10-CM

## 2022-04-25 DIAGNOSIS — G89.29 CHRONIC SHOULDER PAIN, UNSPECIFIED LATERALITY: ICD-10-CM

## 2022-04-25 DIAGNOSIS — Z12.39 SCREENING BREAST EXAMINATION: ICD-10-CM

## 2022-04-25 DIAGNOSIS — L81.9 HYPOPIGMENTATION: Primary | ICD-10-CM

## 2022-04-25 DIAGNOSIS — F19.21 DRUG ADDICTION IN REMISSION (HCC): ICD-10-CM

## 2022-04-25 PROCEDURE — 4004F PT TOBACCO SCREEN RCVD TLK: CPT | Performed by: INTERNAL MEDICINE

## 2022-04-25 PROCEDURE — G8419 CALC BMI OUT NRM PARAM NOF/U: HCPCS | Performed by: INTERNAL MEDICINE

## 2022-04-25 PROCEDURE — G8427 DOCREV CUR MEDS BY ELIG CLIN: HCPCS | Performed by: INTERNAL MEDICINE

## 2022-04-25 PROCEDURE — 99214 OFFICE O/P EST MOD 30 MIN: CPT | Performed by: INTERNAL MEDICINE

## 2022-04-25 SDOH — ECONOMIC STABILITY: FOOD INSECURITY: WITHIN THE PAST 12 MONTHS, THE FOOD YOU BOUGHT JUST DIDN'T LAST AND YOU DIDN'T HAVE MONEY TO GET MORE.: NEVER TRUE

## 2022-04-25 SDOH — ECONOMIC STABILITY: FOOD INSECURITY: WITHIN THE PAST 12 MONTHS, YOU WORRIED THAT YOUR FOOD WOULD RUN OUT BEFORE YOU GOT MONEY TO BUY MORE.: NEVER TRUE

## 2022-04-25 ASSESSMENT — SOCIAL DETERMINANTS OF HEALTH (SDOH): HOW HARD IS IT FOR YOU TO PAY FOR THE VERY BASICS LIKE FOOD, HOUSING, MEDICAL CARE, AND HEATING?: NOT HARD AT ALL

## 2022-04-25 NOTE — PATIENT INSTRUCTIONS
Let me know what dermatologist is available under your plan and I will give a referral.    I think going ahead with your dental extraction is reasonable. Consider having a tetanus and pneumonia vaccine    I have ordered a mammogram that you can schedule at your convenience.

## 2022-04-25 NOTE — PROGRESS NOTES
HPI: Cristopher Greer presents for follow-up      Health issues include diagnosis of bipolar disorder, hypopigmentation, history substance abuse, Suboxone use, false positive hepatitis C negative viral load, irregular menses, elevated BMI, history of bulimia, dental disease,shoulder pain    Up coming teeth extraction. Is concerned    + ortho for shoulder physical therapy. Feeling better. Does stretches.     Hypopigmentation left side of the neck and chest.  Some progression over extremities. Was on Diflucan and [facility] with some improvement. Has not had a biopsy. No known thyroid disease or B12 deficiency. No family history of vitiligo. Positive family history of lupus. Seems to be persistent. In sun exposed areas primarily    Mood disorder. Nancy Marleny the past but not currently taking.     Elevated liver function test  on Depakote. False positive hepatitis C negative hepatitis B. Immunity to a but not to B  Repeat liver function test are normal.     Drug addiction in remission. Rehab. Suboxone. Clonidine and Vistaril. 1 child with autism. Has a . Going into therapy soon per their recommendation.     Notes irregular menses. Some menorrhagia. . Therapeutic  for placenta previa . Remotely abnormal Pap. Tiana Miles Positive BV.trichomonas    No family history of GYN cancers. Negative cotesting . BI-RADS mammogram 2020         Tobacco cessation.  Vapes.  No flu, pneumonia, or Covid vaccines.      dental disease.  Broken teeth.  Enamel loss when she had bulimia.  Needs extractions.  Continue to break     Dermatochalasis bilateral upper lids with vision compromise. Has not yet had visual field testing.   Has seen plastic surgery           Specialists:  subaxone clinic  [facility]  ortho        PMH:  Hospitalization , ,     SH lives with r 2/3 children ( 15,12 ) child with sensory integration problems. 1 vape letter alcohol .      FH: 1 sister   + DM, substance abuse    - colon, breast or ovarian, no known heart disease.     Review of systems: Remote concussion.  Substance abuse in remission.  Poor dentition.   Bulimia occasional sinus symptoms.   Tobacco addiction.  Denies pulmonary function test.  Has had an inhaler remotely none currently.  Not interested in smoking cessation currently.  Has a patch.  No hemoptysis. Denies any chest pain palpitations.  Rare GE reflux.  Has constipation. No kidney stones recurrent bladder infections.  Positive vaginitis.  Dyspareunia.  History abnormal Pap in the past.  Treated with procedure.  Hypopigmentation's chest and left side of her neck being treated with Selsun Blue without improvement.     Constitutional, ent, CV, respiratory, GI, , joint, skin, allergic and psychiatric ROS reviewed and negative except for above    Allergies   Allergen Reactions    Norco [Hydrocodone-Acetaminophen] Other (See Comments)    Tramadol      \"felt weird\"       Outpatient Medications Marked as Taking for the 4/25/22 encounter (Office Visit) with Vin Means MD   Medication Sig Dispense Refill    naloxone 4 MG/0.1ML LIQD nasal spray       predniSONE (DELTASONE) 10 MG tablet 3 Tabs BID x 6 Days  2 Tabs BID x 2 Days  1 Tab BID x 2 Days  1 Tab QD x 2 days 50 tablet 0    ibuprofen (ADVIL;MOTRIN) 800 MG tablet 1/2 to 1 bid prn pain while on pepcid 60 tablet 5    famotidine (PEPCID) 20 MG tablet TAKE ONE TABLET BY MOUTH TWICE A DAY 60 tablet 5    cloNIDine (CATAPRES) 0.2 MG tablet Take 0.2 mg by mouth 2 times daily      hydrOXYzine (VISTARIL) 100 MG capsule Take 100 mg by mouth every 8 hours      Buprenorphine HCl-Naloxone HCl (SUBOXONE SL) Place under the tongue               Past Medical History:   Diagnosis Date    Abnormal Pap smear     dysplasia    Anemia     Anxiety     Bipolar 1 disorder (HCC)     Depression     Drug addiction in remission ([facility] [address] 4/26/2021    False positive serological test for hepatitis C 10/5/2020    3/2021 2021 Neg viral load. + Hep a ab, no Hep B ab    Hepatitis C     Kidney infection     Mental disorder     anxiety,  bipolar disorder    Miscarriage     PID (acute pelvic inflammatory disease)     UTI (lower urinary tract infection)     UTI (lower urinary tract infection)        Past Surgical History:   Procedure Laterality Date     SECTION      ,,     SECTION      DILATION AND CURETTAGE OF UTERUS      INDUCED                Family History   Problem Relation Age of Onset    Arthritis Mother     Diabetes Father     High Blood Pressure Father     Diabetes Maternal Aunt     Diabetes Paternal Aunt     Cancer Paternal Uncle     Cancer Maternal Grandmother     Cancer Maternal Grandfather     Diabetes Paternal Grandmother     Cancer Paternal Grandfather              Objective     /77   Pulse 75   Resp 12   Ht 5' 5\" (1.651 m)   Wt 165 lb (74.8 kg)   SpO2 100%   BMI 27.46 kg/m²     @LASTSAO2(3)@    Wt Readings from Last 3 Encounters:   22 162 lb (73.5 kg)   10/25/21 167 lb (75.8 kg)   10/25/21 167 lb (75.8 kg)       Physical Exam     NAD alert and cooperative  HEENT: Poor dentition. Throat is clear no oral ulcers. TMs are unremarkable. No submandibular adenopathy no thyroid enlargement or cervical adenopathy. Lungs decreased breath sounds however no wheezes rales or rhonchi. Cardiovascular exam regular rate and rhythm without any murmur click. Abdomen is benign. No mass or rebound. Good pulses feet. Good range of motion of the joints. Hyperpigmented plaques on neck and upper chest without scale. No erythema. She is appropriate well-groomed.   Good eye contact      Chemistry        Component Value Date/Time     10/14/2020 0000    K 4.8 10/14/2020 0000     10/14/2020 0000    CO2 21 10/14/2020 0000    BUN 12 10/14/2020 0000    CREATININE 0.91 10/14/2020 0000        Component Value Date/Time    CALCIUM 9.9 10/14/2020 0000    ALKPHOS 64 10/14/2020 0000    AST 21 10/14/2020 0000    ALT 16 10/14/2020 0000    BILITOT 0.3 10/14/2020 0000            Lab Results   Component Value Date    WBC 12.8 (H) 10/28/2014    HGB 14.5 10/28/2014    HCT 43.1 10/28/2014    MCV 90.9 10/28/2014     10/28/2014     No results found for: LABA1C  No results found for: EAG  No results found for: LABA1C  No components found for: CHLPL  Lab Results   Component Value Date    TRIG 338 10/14/2020     Lab Results   Component Value Date    HDL 38 10/14/2020     Lab Results   Component Value Date    LDLCALC 108 10/14/2020     No results found for: LABVLDL    Old labs and records reviewed or requested  Discussed past lab and studies with patient      Diagnosis Orders   1. Hypopigmentation  SEVEN Reflex to Antibody Cascade    Vitamin B12    TSH with Reflex to FT4   2. Chronic shoulder pain, unspecified laterality     3. Drug addiction in remission ([facility] [address]     4. Screening breast examination  HOSSEIN DIGITAL SCREEN W OR WO CAD BILATERAL     Pigmentation. Mother with lupus. I do not believe this is tinea versicolor. Check SEVEN with reflex. Needs to have biopsy performed. Difficulty finding a dermatologist.  We will also check B12 and TSH. Chronic shoulder pain. Doing better. Physical therapy for this and low back. Drug addiction in remission    Breast screening mammogram ordered    Last pneumococcal vaccine and tetanus. Discussed smoking cessation    There are no diagnoses linked to this encounter. On this date I have spent 30 minutes reviewing previous notes, test results, and face to face with the patient discussing the diagnosis and plan          No follow-ups on file. Diagnosis and treatment discussed.   Possible side effects of medication reviewed  Patients questions answered  Follow up understood  Pt aware if they are not contacted about any test results , this does not mean they are normal.  They should call

## 2022-04-26 ENCOUNTER — HOSPITAL ENCOUNTER (OUTPATIENT)
Dept: PHYSICAL THERAPY | Age: 42
Setting detail: THERAPIES SERIES
Discharge: HOME | End: 2022-04-26
Payer: COMMERCIAL

## 2022-04-26 PROCEDURE — 97110 THERAPEUTIC EXERCISES: CPT

## 2022-04-26 PROCEDURE — 97140 MANUAL THERAPY 1/> REGIONS: CPT

## 2022-04-26 PROCEDURE — 97112 NEUROMUSCULAR REEDUCATION: CPT

## 2022-04-26 NOTE — FLOWSHEET NOTE
[address] [facility], [place]    Physical Therapy Daily Treatment Note  Date:  2022    Patient Name:  Lizandro Braxton    :  1980  MRN: [id number]  Restrictions/Precautions:    Medical/Treatment Diagnosis Information:  · Diagnosis: M75.01 R Shoulder Adhesive capsulitis  · Treatment Diagnosis: M25.611 R Shoulder Stiffness; M25.511 R Shoulder Pain; Insurance/Certification information:  PT Insurance Information: [employer] 30 VPCY soft max; no auth  Physician Information:  Referring Practitioner: Dr. Luly Barroso  Has the plan of care been signed (Y/N):        []  Yes  [x]  No     Date of Patient follow up with Physician: 22      Is this a Progress Report:     []  Yes  [x]  No        If Yes:  Date Range for reporting period:  Beginning: 3/31/22  Ending:    Progress report will be due (10 Rx or 30 days whichever is less):        Recertification will be due (POC Duration  / 90 days whichever is less): 22         Visit # Insurance Allowable Auth Required   6  VOCY []  Yes []  No        OUTCOME MEASURE DATE SCORE   FOTO SHOULDER 3/31/22 52%                  Latex Allergy:  [x]NO      []YES  Preferred Language for Healthcare:   [x]English       []other:    Pain level:   0/10     SUBJECTIVE:  Patient states her shoulder is feeling  better. States her back has been feeling a little better, too. She states she has noticed a knot in her R upper trap that has been bothering her. OBJECTIVE:       22  ROM PROM AROM  Comment     L R L R     Flexion     180 180     Abduction     180 130!      ER at side  At 45  At 90 70  90  95 15  20  25 T7 T2     IR at 90 80 20 T3 T7     Other  (cervical)             Other                 3/31/22  Strength L R Comment   Flexion 5 4+ Mild pain   Abduction 5 4 Mild pain   ER 5 4+     IR 5 5     Supraspinatus         Upper Trap         Lower Trap         Mid Trap         Rhomboids         Biceps         Triceps         Horizontal Abduction         Horizontal Adduction         Lats            3/31/22 deferred  Special Tests Results/Comment   JONATAN Cervantes     Painful Arc     Drop Arm     ER Lag Sign     Empty Can / Mitali Larger body adduction     Apprehension/Relocation     Hornblower's     Bicep Load II              Reflexes/Sensation:               [x]Dermatomes/Myotomes intact               []Reflexes equal and normal bilaterally               []Other:     Joint mobility:               []Normal               [x]Hypo: decreased posterior and inferior GH mobility on right shoulder              [x]Hyper: left shoulder is hypermobile; patient also is (+) on Beighton's scale     Palpation: general TTP throughout right shoulder including deltoid, RC insertion at greater tuberosity, biceps tendon, AC joint     Functional Mobility/Transfers: ind     Posture: very guarded on right side; rounded shoulders     Bandages/Dressings/Incisions: n/a     Gait: (include devices/WB status): guarded on right; decreased arm swing     Orthopedic Special Tests: see above. RESTRICTIONS/PRECAUTIONS: Smoker; Anxiety;      Exercises/Interventions:   Exercise/Equipment Resistance/Repetitions Other comments   Stretching/PROM     Cane ER Table Slides    Wall Slides     OH Cane Flexion     Pulleys 10x10\" Flexion  10x10\" Scaption    Forward Hangs     Cross Body Stretch         A/A Extension  10x5\"    Sleeper Stretch 10x10\" Cues to not let elbow drop down   IR Strap stretch 10x10\"    Thoracic ext over foam roller 10 sec x 10     Open Book  10x5\" R/L          Upper Trap Stretch     Levator Scap Stretch                    Isometrics     Retraction          Weight shift     Flexion     Abduction     External Rotation     Internal Rotation     Biceps     Triceps          PRE's     Flexion     Abduction 2x10 in sidelying    Scaption/Full Can     External Rotation 2x10 in sidelying     Internal Rotation     Shrugs     Prone Extension     Serratus     Prone T     Prone Y     Biceps     Triceps     Retraction 10 sec x 10          Cable Column/Theraband     External Rotation     Internal Rotation     Shrugs     Lats     Ext     Flex     Scapular Retraction     BIC     TRIC     PNF          Dynamic Stability     BoW     Body Blade          Manual interventions     Suboccipital release with rib 1 mobilizations and upper trap stretching R upper trap STM in prone 8' R shoulder stretching - flex, ABD, IR, and ER        Patient Education:   3/31/22: Patient educated about PT diagnosis, prognosis, and plan of care; educated on role of physical therapy; educated on HEP; educated on anatomy of shoulder joint; discussed likely length of PT/rehab; emphasized importance of HEP and frequency of HEP; discussed ice after stretching but heat for relaxation or warm up. HEP:  Patient instructed on HEP on this date with handout provided and all q 4uestions answered. Discussions about how to progress sets/reps/resistance as necessary for fatigue and challenge. Patient was instructed to contact PT with any questions or concerns about HEP moving forward. Patient verbally stated she/he understood. Access Code: [email or web address]  URL: [employer]/  Date: 03/31/2022  Prepared by:  Kojo Vizcarra    Exercises  Supine Shoulder External Rotation with Dowel - 3 x daily - 7 x weekly - 10 reps - 10 hold  Sleeper Stretch - 3 x daily - 7 x weekly - 10 reps - 10 hold  Seated Shoulder Flexion Towel Slide at Table Top - 3 x daily - 7 x weekly - 10 reps - 10 hold  Seated Shoulder Scaption Slide at Table Top with Forearm in Neutral - 3 x daily - 7 x weekly - 10 reps - 10 hold  Seated Scapular Retraction - 1 x daily - 7 x weekly - 2 sets - 10 reps - 5 hold  Child's Pose Stretch - 1 x daily - 7 x weekly - 10 reps - 10 hold  Cat-Camel - 1 x daily - 7 x weekly - 10 reps - 10 hold      Therapeutic Exercise and NMR EXR  [x] (73247) Provided verbal/tactile cueing for activities related to strengthening, flexibility, endurance, ROM  for improvements in scapular, scapulothoracic and UE control with self care, reaching, carrying, lifting, house/yardwork, driving/computer work. [x] (37034) Provided verbal/tactile cueing for activities related to improving balance, coordination, kinesthetic sense, posture, motor skill, proprioception  to assist with  scapular, scapulothoracic and UE control with self care, reaching, carrying, lifting, house/yardwork, driving/computer work. Therapeutic Activities:    [x] (05445 or 07545) Provided verbal/tactile cueing for activities related to improving balance, coordination, kinesthetic sense, posture, motor skill, proprioception and motor activation to allow for proper function of scapular, scapulothoracic and UE control with self care, carrying, lifting, driving/computer work.      Home Exercise Program:    [x] (74655) Reviewed/Progressed HEP activities related to strengthening, flexibility, endurance, ROM of scapular, scapulothoracic and UE control with self care, reaching, carrying, lifting, house/yardwork, driving/computer work  [] (91723) Reviewed/Progressed HEP activities related to improving balance, coordination, kinesthetic sense, posture, motor skill, proprioception of scapular, scapulothoracic and UE control with self care, reaching, carrying, lifting, house/yardwork, driving/computer work      Manual Treatments:  PROM / STM / Oscillations-Mobs:  G-I, II, III, IV (PA's, Inf., Post.)  [x] (42088) Provided manual therapy to mobilize soft tissue/joints of cervical/CT, scapular GHJ and UE for the purpose of modulating pain, promoting relaxation,  increasing ROM, reducing/eliminating soft tissue swelling/inflammation/restriction, improving soft tissue extensibility and allowing for proper ROM for normal function with self care, reaching, carrying, lifting, house/yardwork, driving/computer work    Modalities: Charges:  Timed Code Treatment Minutes: 40'    Total Treatment Minutes: 40'     420-500    [] EVAL (LOW) 455 [id number] (typically 20 minutes face-to-face)  [] EVAL (MOD) 18144 (typically 30 minutes face-to-face)   [] EVAL (HIGH) 35002 (typically 45 minutes face-to-face)   [] RE-EVAL     [x] LA(34886) x 1  [] IONTO  [x] NMR (54745) x  1   [] VASO  [x] Manual (90537) x  1    [] Other:  [] TA x      [] Mech Traction (23117)  [] ES(attended) (27111)      [] ES (un) (57845):     GOALS:   Patient stated goal: Reduce pain in shoulder    [] Progressing: [] Met: [] Not Met: [] Adjusted    Therapist goals for Patient:   Short Term Goals: To be achieved in: 2 weeks  1. Independent in HEP and progression per patient tolerance, in order to prevent re-injury. [] Progressing: [] Met: [] Not Met: [] Adjusted   2. Patient will have a decrease in pain to facilitate improvement in movement, function, and ADLs as indicated by Functional Deficits. [] Progressing: [] Met: [] Not Met: [] Adjusted    Long Term Goals: To be achieved in: 12 weeks  1. Score of 67% or better on FOTO SHOULDER to assist with reaching prior level of function. [] Progressing: [] Met: [] Not Met: [] Adjusted  2. Patient will demonstrate increased AROM to 0-170 flexion and abduciton, 0-90 ER and 0-50 IR or better without pain  to allow for proper joint functioning as indicated by patients Functional Deficits. [] Progressing: [] Met: [] Not Met: [] Adjusted  3. Patient will demonstrate an increase in strength to 5/5 in right shoulder flexion, abduciton, ER and IR to allow for proper functional mobility as indicated by patients Functional Deficits. [] Progressing: [] Met: [] Not Met: [] Adjusted  4. Patient will return to [place]  functional activities without increased symptoms or restriction. [] Progressing: [] Met: [] Not Met: [] Adjusted  5.  Patient will be able to get dressed and reach behind back with <2/10 pain in shoulder (patient specific functional goal)    [] Progressing: [] Met: [] Not Met: [] Adjusted    Overall Progression Towards Functional goals/ Treatment Progress Update:  [] Patient is progressing as expected towards functional goals listed. [] Progression is slowed due to complexities/Impairments listed. [] Progression has been slowed due to co-morbidities. [x] Plan just implemented, too soon to assess goals progression <30days   [] Goals require adjustment due to lack of progress  [] Patient is not progressing as expected and requires additional follow up with physician  [] Other    Prognosis for POC: [x] Good [] Fair  [] Poor      Patient requires continued skilled intervention: [x] Yes  [] No    Treatment/Activity Tolerance:  [x] Patient able to complete treatment  [] Patient limited by fatigue  [] Patient limited by pain     [] Patient limited by other medical complications  [] Other: Patient with significant improvement in AROM abduction, measuring at 180 degrees at the end of this session. Manual therapy preformed to address upper trap tissue restriction complaints. Patient reported good relief after this intervention. PLAN: See eval; Progress posterior capsule stretche  [x] Continue per plan of care [] Alter current plan (see comments above)  [] Plan of care initiated [] Hold pending MD visit [] Discharge      Electronically signed by:  Pam King, PT, DPT              Note: If patient does not return for scheduled/ recommended follow up visits, this note will serve as a discharge from care along with most recent update on progress.

## 2022-04-28 ENCOUNTER — HOSPITAL ENCOUNTER (OUTPATIENT)
Dept: PHYSICAL THERAPY | Age: 42
Setting detail: THERAPIES SERIES
Discharge: HOME | End: 2022-04-28
Payer: COMMERCIAL

## 2022-04-28 NOTE — FLOWSHEET NOTE
[address] [facility], [place]    Physical Therapy  Cancellation/No-show Note  Patient Name:  Pako Pena  :  1980   Date:  2022  Cancelled visits to date: 1  No-shows to date: 0    For today's appointment patient:  [x]  Cancelled   []  Rescheduled appointment  []  No-show     Reason given by patient:  []  Patient ill  []  Conflicting appointment  []  No transportation    []  Conflict with work  []  No reason given  [x]  Other:  \"Plumbing issues, kitchen is flooded\"   Comments:      Phone call information:   []  Phone call made today to patient at _ time at number provided:      []  Patient answered, conversation as follows:    []  Patient did not answer, message left as follows:  []  Phone call not made today  [x]  Phone call not needed - pt contacted us to cancel and provided reason for cancellation.      Electronically signed by:  [facility], [id number]

## 2022-05-03 ENCOUNTER — HOSPITAL ENCOUNTER (OUTPATIENT)
Dept: PHYSICAL THERAPY | Age: 42
Setting detail: THERAPIES SERIES
Discharge: HOME | End: 2022-05-03
Payer: COMMERCIAL

## 2022-05-03 NOTE — FLOWSHEET NOTE
[address] [facility], [place]    Physical Therapy  Cancellation/No-show Note  Patient Name:  Darene Lundborg  :  1980   Date:  5/3/2022  Cancelled visits to date: 2  No-shows to date: 0    For today's appointment patient:  [x]  Cancelled , 5/3  []  Rescheduled appointment  []  No-show     Reason given by patient:  []  Patient ill  []  Conflicting appointment  []  No transportation    []  Conflict with work  []  No reason given  [x]  Other: Working the [employer]    Comments:      Phone call information:   []  Phone call made today to patient at _ time at number provided:      []  Patient answered, conversation as follows:    []  Patient did not answer, message left as follows:  []  Phone call not made today  [x]  Phone call not needed - pt contacted us to cancel and provided reason for cancellation.      Electronically signed by:  Jerold Riedel, PTA, [id number]

## 2022-05-05 ENCOUNTER — HOSPITAL ENCOUNTER (OUTPATIENT)
Dept: PHYSICAL THERAPY | Age: 42
Setting detail: THERAPIES SERIES
Discharge: HOME | End: 2022-05-05
Payer: COMMERCIAL

## 2022-05-05 NOTE — FLOWSHEET NOTE
[address] [facility], [place]    Physical Therapy  Cancellation/No-show Note  Patient Name:  Gabby Crockett  :  1980   Date:  2022  Cancelled visits to date: 3  No-shows to date: 0    For today's appointment patient:  [x]  Cancelled , 5/3,   []  Rescheduled appointment  []  No-show     Reason given by patient:  []  Patient ill  [x]  Conflicting appointment  []  No transportation    []  Conflict with work  []  No reason given  []  Other: Dentist    Comments:      Phone call information:   []  Phone call made today to patient at _ time at number provided:      []  Patient answered, conversation as follows:    []  Patient did not answer, message left as follows:  []  Phone call not made today  [x]  Phone call not needed - pt contacted us to cancel and provided reason for cancellation.      Electronically signed by:  Fariha Branch, ROBBI, [id number]

## 2022-05-10 ENCOUNTER — HOSPITAL ENCOUNTER (OUTPATIENT)
Dept: PHYSICAL THERAPY | Age: 42
Setting detail: THERAPIES SERIES
Discharge: HOME | End: 2022-05-10
Payer: COMMERCIAL

## 2022-05-10 PROCEDURE — 97110 THERAPEUTIC EXERCISES: CPT

## 2022-05-10 NOTE — PLAN OF CARE
[address]    Physical Therapy Re-Certification Plan of Yonny Goldman      Dear  Dr Cherie Rodriguez,    We had the pleasure of treating the following patient for physical therapy services at [address] [place] [address]. A summary of our findings can be found in the updated assessment below. This includes our plan of care. If you have any questions or concerns regarding these findings, please do not hesitate to contact me at the office phone number checked above. Thank you for the referral.     Physician Signature:________________________________Date:__________________  By signing above (or electronic signature), therapists plan is approved by physician    Date Range Of Visits: 3/31/22-5/10/22  Total Visits to Date: 7  Overall Response to Treatment:   [x]Patient is responding well to treatment and improvement is noted with regards  to goals   []Patient should continue to improve in reasonable time if they continue HEP   []Patient has plateaued and is no longer responding to skilled PT intervention    []Patient is getting worse and would benefit from return to referring MD   []Patient unable to adhere to initial POC   [x]Other: Through this period, the patient has made good improvements in pain, ROM, and strength. Largest limitations remaining functional IR ROM, as the patient has difficulty reaching bra strap. Patient notes her HEP compliance has been minimal, as well as her compliance with the tapered steroid package from the MD. Patient returning to skilled physical therapy after 3 canceled visits. Treatment limited as the patient arrived late to appointment and had to leave early. The patient will continue to be progressed with shoulder mobility and strength as tolerated in order to return to her pain free PLOF.          Physical Therapy Daily Treatment Note  Date:  5/10/2022    Patient Name:  Kriss Guaman    :  1980  MRN: [id number]  Restrictions/Precautions:    Medical/Treatment Diagnosis Information:  · Diagnosis: M75.01 R Shoulder Adhesive capsulitis  · Treatment Diagnosis: M25.611 R Shoulder Stiffness; M25.511 R Shoulder Pain;   Insurance/Certification information:  PT Insurance Information: [employer]; 30 VPCY soft max; no auth  Physician Information:  Referring Practitioner: Dr. Rashaad Paz  Has the plan of care been signed (Y/N):        []  Yes  [x]  No     Date of Patient follow up with Physician: 22      Is this a Progress Report:     [x]  Yes  []  No        If Yes:  Date Range for reporting period:  Beginning: 3/31/22  Endin/10/22    Progress report will be due (10 Rx or 30 days whichever is less):        Recertification will be due (POC Duration  / 90 days whichever is less): 6/10/22         Visit # Insurance Allowable Auth Required   7 30 Vero Large []  Yes []  No        OUTCOME MEASURE DATE SCORE   FOTO SHOULDER 3/31/22 52%    FOTO  5/10/22  24% Deficit           Latex Allergy:  [x]NO      []YES  Preferred Language for Healthcare:   [x]English       []other:    Pain level:   0/10     SUBJECTIVE:  Patient states the most challenging activities remain to be reaching behind her back to reach bra strap and reaching backwards to grab a seatbelt         OBJECTIVE:       5/10/22  ROM PROM AROM  Comment     L R L R     Flexion     180 180     Abduction     180 180     ER at side  At 45  At 90 70  90  95 15  20  25 T7 T4     IR at 90 80 20 T3 T7     Other  (cervical)             Other                 5/10/22  Strength L R Comment   Flexion 5 5    Abduction 5 4+ Mild pain   ER 5 5     IR 5 4+     Supraspinatus         Upper Trap         Lower Trap         Mid Trap         Rhomboids         Biceps         Triceps         Horizontal Abduction         Horizontal Adduction         Lats            3/31/22 deferred  Special Tests Results/Comment   IRRST     Karel Cervantes     Painful Arc     Drop Arm     ER Lag Sign     Empty Can / Charito Vinh body adduction     Apprehension/Relocation     Hornblower's     Bicep Load II              Reflexes/Sensation:               [x]Dermatomes/Myotomes intact               []Reflexes equal and normal bilaterally               []Other:     Joint mobility:               []Normal               [x]Hypo: decreased posterior and inferior GH mobility on right shoulder              [x]Hyper: left shoulder is hypermobile; patient also is (+) on Beighton's scale     Palpation: general TTP throughout right shoulder including deltoid, RC insertion at greater tuberosity, biceps tendon, AC joint     Functional Mobility/Transfers: ind     Posture: very guarded on right side; rounded shoulders     Bandages/Dressings/Incisions: n/a     Gait: (include devices/WB status): guarded on right; decreased arm swing     Orthopedic Special Tests: see above. RESTRICTIONS/PRECAUTIONS: Smoker; Anxiety;      Exercises/Interventions:   Exercise/Equipment Resistance/Repetitions Other comments   Stretching/PROM     Cane ER Table Slides    Wall Slides     OH Cane Flexion     Pulleys    Forward Hangs    Cross Body Stretch        A/A Extension     Sleeper Stretch 10x10\" Cues to not let elbow drop down   IR Strap stretch 10x10\"    Thoracic ext over foam roller    Open Book          Upper Trap Stretch     Levator Scap Stretch                    Isometrics     Retraction          Weight shift     Flexion     Abduction     External Rotation     Internal Rotation     Biceps     Triceps          PRE's     Flexion     Abduction    Scaption/Full Can    External Rotation    Internal Rotation    Shrugs    Prone Extension    Serratus    Prone T    Prone Y    Biceps    Triceps    Retraction         Cable Column/Theraband     External Rotation     Internal Rotation Blue 2x10    Shrugs     Lats     Ext     Flex     Scapular Retraction     BIC     TRIC     PNF          Dynamic Stability     BoW Body Blade          Manual interventions     Suboccipital release with rib 1 mobilizations and upper trap stretching R shoulder stretching - flex, ABD, IR, and ER        Patient Education:   3/31/22: Patient educated about PT diagnosis, prognosis, and plan of care; educated on role of physical therapy; educated on HEP; educated on anatomy of shoulder joint; discussed likely length of PT/rehab; emphasized importance of HEP and frequency of HEP; discussed ice after stretching but heat for relaxation or warm up. HEP:  Patient instructed on HEP on this date with handout provided and all q 4uestions answered. Discussions about how to progress sets/reps/resistance as necessary for fatigue and challenge. Patient was instructed to contact PT with any questions or concerns about HEP moving forward. Patient verbally stated she/he understood. Access Code: [email or web address]  URL: [email or web address]  Date: 03/31/2022  Prepared by: Kojo Vizcarra    Exercises  Supine Shoulder External Rotation with Dowel - 3 x daily - 7 x weekly - 10 reps - 10 hold  Sleeper Stretch - 3 x daily - 7 x weekly - 10 reps - 10 hold  Seated Shoulder Flexion Towel Slide at Table Top - 3 x daily - 7 x weekly - 10 reps - 10 hold  Seated Shoulder Scaption Slide at Table Top with Forearm in Neutral - 3 x daily - 7 x weekly - 10 reps - 10 hold  Seated Scapular Retraction - 1 x daily - 7 x weekly - 2 sets - 10 reps - 5 hold  Child's Pose Stretch - 1 x daily - 7 x weekly - 10 reps - 10 hold  Cat-Camel - 1 x daily - 7 x weekly - 10 reps - 10 hold      Therapeutic Exercise and NMR EXR  [x] (35808) Provided verbal/tactile cueing for activities related to strengthening, flexibility, endurance, ROM  for improvements in scapular, scapulothoracic and UE control with self care, reaching, carrying, lifting, house/yardwork, driving/computer work.     [x] (06300) Provided verbal/tactile cueing for activities related to improving balance, coordination, kinesthetic sense, posture, motor skill, proprioception  to assist with  scapular, scapulothoracic and UE control with self care, reaching, carrying, lifting, house/yardwork, driving/computer work. Therapeutic Activities:    [x] (94812 or 80621) Provided verbal/tactile cueing for activities related to improving balance, coordination, kinesthetic sense, posture, motor skill, proprioception and motor activation to allow for proper function of scapular, scapulothoracic and UE control with self care, carrying, lifting, driving/computer work.      Home Exercise Program:    [x] (57263) Reviewed/Progressed HEP activities related to strengthening, flexibility, endurance, ROM of scapular, scapulothoracic and UE control with self care, reaching, carrying, lifting, house/yardwork, driving/computer work  [] (23527) Reviewed/Progressed HEP activities related to improving balance, coordination, kinesthetic sense, posture, motor skill, proprioception of scapular, scapulothoracic and UE control with self care, reaching, carrying, lifting, house/yardwork, driving/computer work      Manual Treatments:  PROM / STM / Oscillations-Mobs:  G-I, II, III, IV (PA's, Inf., Post.)  [x] (69281) Provided manual therapy to mobilize soft tissue/joints of cervical/CT, scapular GHJ and UE for the purpose of modulating pain, promoting relaxation,  increasing ROM, reducing/eliminating soft tissue swelling/inflammation/restriction, improving soft tissue extensibility and allowing for proper ROM for normal function with self care, reaching, carrying, lifting, house/yardwork, driving/computer work    Modalities:       Charges:  Timed Code Treatment Minutes: 18'    Total Treatment Minutes: 21'     [phone]    [] EVAL (LOW) 86953 (typically 20 minutes face-to-face)  [] EVAL (MOD) 27582 (typically 30 minutes face-to-face)   [] EVAL (HIGH) 51498 (typically 45 minutes face-to-face)   [] RE-EVAL     [x] VE(37447) x 1  [] IONTO  [] NMR (03620) x     [] VASO  [] Manual ([id number]) x      [] Other:  [] TA x      [] Mech Traction (72962)  [] ES(attended) (59902)      [] ES (un) (69822):     GOALS:   Patient stated goal: Reduce pain in shoulder    [] Progressing: [] Met: [] Not Met: [] Adjusted    Therapist goals for Patient:   Short Term Goals: To be achieved in: 2 weeks  1. Independent in HEP and progression per patient tolerance, in order to prevent re-injury. [x] Progressing: [] Met: [] Not Met: [] Adjusted   2. Patient will have a decrease in pain to facilitate improvement in movement, function, and ADLs as indicated by Functional Deficits. [] Progressing: [x] Met: [] Not Met: [] Adjusted    Long Term Goals: To be achieved in: 12 weeks  1. Score of 67% or better on FOTO SHOULDER to assist with reaching prior level of function. [] Progressing: [x] Met: [] Not Met: [] Adjusted  2. Patient will demonstrate increased AROM to 0-170 flexion and abduciton, 0-90 ER and 0-50 IR or better without pain  to allow for proper joint functioning as indicated by patients Functional Deficits. [x] Progressing: [] Met: [] Not Met: [] Adjusted  3. Patient will demonstrate an increase in strength to 5/5 in right shoulder flexion, abduciton, ER and IR to allow for proper functional mobility as indicated by patients Functional Deficits. [x] Progressing: [] Met: [] Not Met: [] Adjusted  4. Patient will return to [place]  functional activities without increased symptoms or restriction. [] Progressing: [x] Met: [] Not Met: [] Adjusted  5. Patient will be able to get dressed and reach behind back with <2/10 pain in shoulder (patient specific functional goal)    [x] Progressing: [] Met: [] Not Met: [] Adjusted    Overall Progression Towards Functional goals/ Treatment Progress Update:  [] Patient is progressing as expected towards functional goals listed. [] Progression is slowed due to complexities/Impairments listed. [] Progression has been slowed due to co-morbidities.   [x] Plan just implemented, too soon to assess goals progression <30days   [] Goals require adjustment due to lack of progress  [] Patient is not progressing as expected and requires additional follow up with physician  [] Other    Prognosis for POC: [x] Good [] Fair  [] Poor      Patient requires continued skilled intervention: [x] Yes  [] No    Treatment/Activity Tolerance:  [x] Patient able to complete treatment  [] Patient limited by fatigue  [] Patient limited by pain     [] Patient limited by other medical complications  [] Other: Treatment limited as the patient arrived late to appointment and had to leave early. PLAN: Continue 1x week for 8 weeks  [x] Continue per plan of care [] Alter current plan (see comments above)  [] Plan of care initiated [] Hold pending MD visit [] Discharge      Electronically signed by:  Karolina Dey PT, DPT              Note: If patient does not return for scheduled/ recommended follow up visits, this note will serve as a discharge from care along with most recent update on progress.

## 2022-05-12 ENCOUNTER — HOSPITAL ENCOUNTER (OUTPATIENT)
Dept: PHYSICAL THERAPY | Age: 42
Setting detail: THERAPIES SERIES
Discharge: HOME | End: 2022-05-12
Payer: COMMERCIAL

## 2022-05-12 NOTE — FLOWSHEET NOTE
[address] [facility], [place]    Physical Therapy  Cancellation/No-show Note  Patient Name:  Tray Paulino  :  1980   Date:  2022  Cancelled visits to date: 4  No-shows to date: 0    For today's appointment patient:  [x]  Cancelled , 5/3, ,   []  Rescheduled appointment  []  No-show     Reason given by patient:  []  Patient ill  [x]  Conflicting appointment  []  No transportation    []  Conflict with work  []  No reason given  []  Other: Still at another MD office     Comments:      Phone call information:   []  Phone call made today to patient at _ time at number provided:      []  Patient answered, conversation as follows:    []  Patient did not answer, message left as follows:  []  Phone call not made today  [x]  Phone call not needed - pt contacted us to cancel and provided reason for cancellation.      Electronically signed by:  Deanna Velasquez, ROBBI, [id number]

## 2022-05-17 ENCOUNTER — HOSPITAL ENCOUNTER (OUTPATIENT)
Dept: PHYSICAL THERAPY | Age: 42
Setting detail: THERAPIES SERIES
Discharge: HOME | End: 2022-05-17
Payer: COMMERCIAL

## 2022-05-17 NOTE — FLOWSHEET NOTE
[address] [facility], [place]    Physical Therapy  Cancellation/No-show Note  Patient Name:  Amaya Calzada  :  1980   Date:  2022  Cancelled visits to date: 5  No-shows to date: 0    For today's appointment patient:  [x]  Cancelled , 5/3, , ,   []  Rescheduled appointment  []  No-show     Reason given by patient:  []  Patient ill  [x]  Conflicting appointment  []  No transportation    []  Conflict with work  []  No reason given  []  Other: her ride will not pick her up on time. Patient educated due to cancellation policy, she must follow up with MD prior to returning to physical therapy.       Comments:          Electronically signed by:  Juan Luis Hwang PT, DPT

## 2022-05-19 ENCOUNTER — APPOINTMENT (OUTPATIENT)
Dept: PHYSICAL THERAPY | Age: 42
End: 2022-05-19
Payer: COMMERCIAL

## 2022-06-09 ENCOUNTER — OFFICE VISIT (OUTPATIENT)
Dept: ORTHOPEDIC SURGERY | Age: 42
End: 2022-06-09
Payer: COMMERCIAL

## 2022-06-09 VITALS — WEIGHT: 162 LBS | RESPIRATION RATE: 12 BRPM | BODY MASS INDEX: 26.99 KG/M2 | HEIGHT: 65 IN

## 2022-06-09 DIAGNOSIS — M75.01 ADHESIVE CAPSULITIS OF RIGHT SHOULDER: Primary | ICD-10-CM

## 2022-06-09 DIAGNOSIS — M25.511 ACUTE PAIN OF RIGHT SHOULDER: ICD-10-CM

## 2022-06-09 PROCEDURE — G8419 CALC BMI OUT NRM PARAM NOF/U: HCPCS | Performed by: ORTHOPAEDIC SURGERY

## 2022-06-09 PROCEDURE — 4004F PT TOBACCO SCREEN RCVD TLK: CPT | Performed by: ORTHOPAEDIC SURGERY

## 2022-06-09 PROCEDURE — G8427 DOCREV CUR MEDS BY ELIG CLIN: HCPCS | Performed by: ORTHOPAEDIC SURGERY

## 2022-06-09 PROCEDURE — 99212 OFFICE O/P EST SF 10 MIN: CPT | Performed by: ORTHOPAEDIC SURGERY

## 2022-06-09 NOTE — PROGRESS NOTES
Silviano Waterman returns today to follow-up her right shoulder. She feels dramatically better and her range of motion has improved. She did attend a few visits with therapy but unfortunately was dismissed because she missed 5 appointments. She is typically pain-free but sometimes gets pain that is about 2 out of 10. General Exam:    Vitals: Resp. rate 12, height 5' 5\" (1.651 m), weight 162 lb (73.5 kg). Constitutional: Patient is adequately groomed with no evidence of malnutrition  Mental Status: The patient is oriented to time, place and person. The patient's mood and affect are appropriate. Right shoulder today has no tenderness. Elevation is about 170 degrees. Internal rotation is to the upper lumbar spine. She has full strength. Assessment: Improving right shoulder adhesive capsulitis. Plan: We stressed the importance of maintaining her exercise program with an emphasis on flexibility over the next 3 months. She can gradually reintroduce resistance training. Follow-up with me on an as-needed basis.

## 2022-07-18 RX ORDER — FAMOTIDINE 20 MG/1
TABLET, FILM COATED ORAL
Qty: 60 TABLET | Refills: 0 | Status: SHIPPED | OUTPATIENT
Start: 2022-07-18 | End: 2022-08-17

## 2022-07-18 NOTE — TELEPHONE ENCOUNTER
Medication:   Requested Prescriptions     Pending Prescriptions Disp Refills    famotidine (PEPCID) 20 MG tablet [Pharmacy Med Name: FAMOTIDINE 20 MG TABLET] 40 tablet      Sig: TAKE ONE TABLET BY MOUTH TWICE A DAY        Last Filled:  9/20/2021    Patient Phone Number: [phone] (home)     Last appt: 4/25/2022   Next appt: Visit date not found    Last OARRS: No flowsheet data found.

## 2022-08-01 NOTE — TELEPHONE ENCOUNTER
She does not have hepatitis C. Her viral load was zero. Siliq Pregnancy And Lactation Text: The risk during pregnancy and breastfeeding is uncertain with this medication. Azithromycin Pregnancy And Lactation Text: This medication is considered safe during pregnancy and is also secreted in breast milk. Libtayo Counseling- I discussed with the patient the risks of Libtayo including but not limited to nausea, vomiting, diarrhea, and bone or muscle pain.  The patient verbalized understanding of the proper use and possible adverse effects of Libtayo.  All of the patient's questions and concerns were addressed. Sarecycline Counseling: Patient advised regarding possible photosensitivity and discoloration of the teeth, skin, lips, tongue and gums.  Patient instructed to avoid sunlight, if possible.  When exposed to sunlight, patients should wear protective clothing, sunglasses, and sunscreen.  The patient was instructed to call the office immediately if the following severe adverse effects occur:  hearing changes, easy bruising/bleeding, severe headache, or vision changes.  The patient verbalized understanding of the proper use and possible adverse effects of sarecycline.  All of the patient's questions and concerns were addressed. Zyclara Counseling:  I discussed with the patient the risks of imiquimod including but not limited to erythema, scaling, itching, weeping, crusting, and pain.  Patient understands that the inflammatory response to imiquimod is variable from person to person and was educated regarded proper titration schedule.  If flu-like symptoms develop, patient knows to discontinue the medication and contact us. Oral Minoxidil Pregnancy And Lactation Text: This medication should only be used when clearly needed if you are pregnant, attempting to become pregnant or breast feeding. Cellcept Pregnancy And Lactation Text: This medication is Pregnancy Category D and isn't considered safe during pregnancy. It is unknown if this medication is excreted in breast milk. Azelaic Acid Counseling: Patient counseled that medicine may cause skin irritation and to avoid applying near the eyes.  In the event of skin irritation, the patient was advised to reduce the amount of the drug applied or use it less frequently.   The patient verbalized understanding of the proper use and possible adverse effects of azelaic acid.  All of the patient's questions and concerns were addressed. Topical Clindamycin Pregnancy And Lactation Text: This medication is Pregnancy Category B and is considered safe during pregnancy. It is unknown if it is excreted in breast milk. Rhofade Counseling: Rhofade is a topical medication which can decrease superficial blood flow where applied. Side effects are uncommon and include stinging, redness and allergic reactions. Itraconazole Pregnancy And Lactation Text: This medication is Pregnancy Category C and it isn't know if it is safe during pregnancy. It is also excreted in breast milk. Clofazimine Counseling:  I discussed with the patient the risks of clofazimine including but not limited to skin and eye pigmentation, liver damage, nausea/vomiting, gastrointestinal bleeding and allergy. Mirvaso Pregnancy And Lactation Text: This medication has not been assigned a Pregnancy Risk Category. It is unknown if the medication is excreted in breast milk. Dupixent Counseling: I discussed with the patient the risks of dupilumab including but not limited to eye infection and irritation, cold sores, injection site reactions, worsening of asthma, allergic reactions and increased risk of parasitic infection.  Live vaccines should be avoided while taking dupilumab. Dupilumab will also interact with certain medications such as warfarin and cyclosporine. The patient understands that monitoring is required and they must alert us or the primary physician if symptoms of infection or other concerning signs are noted. Hydroquinone Counseling:  Patient advised that medication may result in skin irritation, lightening (hypopigmentation), dryness, and burning.  In the event of skin irritation, the patient was advised to reduce the amount of the drug applied or use it less frequently.  Rarely, spots that are treated with hydroquinone can become darker (pseudoochronosis).  Should this occur, patient instructed to stop medication and call the office. The patient verbalized understanding of the proper use and possible adverse effects of hydroquinone.  All of the patient's questions and concerns were addressed. Acitretin Counseling:  I discussed with the patient the risks of acitretin including but not limited to hair loss, dry lips/skin/eyes, liver damage, hyperlipidemia, depression/suicidal ideation, photosensitivity.  Serious rare side effects can include but are not limited to pancreatitis, pseudotumor cerebri, bony changes, clot formation/stroke/heart attack.  Patient understands that alcohol is contraindicated since it can result in liver toxicity and significantly prolong the elimination of the drug by many years. Erivedge Pregnancy And Lactation Text: This medication is Pregnancy Category X and is absolutely contraindicated during pregnancy. It is unknown if it is excreted in breast milk. Erythromycin Pregnancy And Lactation Text: This medication is Pregnancy Category B and is considered safe during pregnancy. It is also excreted in breast milk. Hydroxyzine Counseling: Patient advised that the medication is sedating and not to drive a car after taking this medication.  Patient informed of potential adverse effects including but not limited to dry mouth, urinary retention, and blurry vision.  The patient verbalized understanding of the proper use and possible adverse effects of hydroxyzine.  All of the patient's questions and concerns were addressed. Cantharidin Pregnancy And Lactation Text: The use of this medication during pregnancy or lactation is not recommended as there is insufficient data. Spironolactone Pregnancy And Lactation Text: This medication can cause feminization of the male fetus and should be avoided during pregnancy. The active metabolite is also found in breast milk. Simponi Counseling:  I discussed with the patient the risks of golimumab including but not limited to myelosuppression, immunosuppression, autoimmune hepatitis, demyelinating diseases, lymphoma, and serious infections.  The patient understands that monitoring is required including a PPD at baseline and must alert us or the primary physician if symptoms of infection or other concerning signs are noted. Bactrim Counseling:  I discussed with the patient the risks of sulfa antibiotics including but not limited to GI upset, allergic reaction, drug rash, diarrhea, dizziness, photosensitivity, and yeast infections.  Rarely, more serious reactions can occur including but not limited to aplastic anemia, agranulocytosis, methemoglobinemia, blood dyscrasias, liver or kidney failure, lung infiltrates or desquamative/blistering drug rashes. Use Enhanced Medication Counseling?: No Sarecycline Pregnancy And Lactation Text: This medication is Pregnancy Category D and not consider safe during pregnancy. It is also excreted in breast milk. Infliximab Pregnancy And Lactation Text: This medication is Pregnancy Category B and is considered safe during pregnancy. It is unknown if this medication is excreted in breast milk. Libtayo Pregnancy And Lactation Text: This medication is contraindicated in pregnancy and when breast feeding. Otezla Counseling: The side effects of Otezla were discussed with the patient, including but not limited to worsening or new depression, weight loss, diarrhea, nausea, upper respiratory tract infection, and headache. Patient instructed to call the office should any adverse effect occur.  The patient verbalized understanding of the proper use and possible adverse effects of Otezla.  All the patient's questions and concerns were addressed. Cyclophosphamide Counseling:  I discussed with the patient the risks of cyclophosphamide including but not limited to hair loss, hormonal abnormalities, decreased fertility, abdominal pain, diarrhea, nausea and vomiting, bone marrow suppression and infection. The patient understands that monitoring is required while taking this medication. Ketoconazole Counseling:   Patient counseled regarding improving absorption with orange juice.  Adverse effects include but are not limited to breast enlargement, headache, diarrhea, nausea, upset stomach, liver function test abnormalities, taste disturbance, and stomach pain.  There is a rare possibility of liver failure that can occur when taking ketoconazole. The patient understands that monitoring of LFTs may be required, especially at baseline. The patient verbalized understanding of the proper use and possible adverse effects of ketoconazole.  All of the patient's questions and concerns were addressed. Finasteride Counseling:  I discussed with the patient the risks of use of finasteride including but not limited to decreased libido, decreased ejaculate volume, gynecomastia, and depression. Women should not handle medication.  All of the patient's questions and concerns were addressed. Hydroxyzine Pregnancy And Lactation Text: This medication is not safe during pregnancy and should not be taken. It is also excreted in breast milk and breast feeding isn't recommended. Metronidazole Counseling:  I discussed with the patient the risks of metronidazole including but not limited to seizures, nausea/vomiting, a metallic taste in the mouth, nausea/vomiting and severe allergy. Zyclara Pregnancy And Lactation Text: This medication is Pregnancy Category C. It is unknown if this medication is excreted in breast milk. Topical Ketoconazole Counseling: Patient counseled that this medication may cause skin irritation or allergic reactions.  In the event of skin irritation, the patient was advised to reduce the amount of the drug applied or use it less frequently.   The patient verbalized understanding of the proper use and possible adverse effects of ketoconazole.  All of the patient's questions and concerns were addressed. Dupixent Pregnancy And Lactation Text: This medication likely crosses the placenta but the risk for the fetus is uncertain. This medication is excreted in breast milk. Acitretin Pregnancy And Lactation Text: This medication is Pregnancy Category X and should not be given to women who are pregnant or may become pregnant in the future. This medication is excreted in breast milk. Azelaic Acid Pregnancy And Lactation Text: This medication is considered safe during pregnancy and breast feeding. Xeljanz Counseling: I discussed with the patient the risks of Xeljanz therapy including increased risk of infection, liver issues, headache, diarrhea, or cold symptoms. Live vaccines should be avoided. They were instructed to call if they have any problems. Opzelura Counseling:  I discussed with the patient the risks of Opzelura including but not limited to nasopharngitis, bronchitis, ear infection, eosinophila, hives, diarrhea, folliculitis, tonsillitis, and rhinorrhea.  Taken orally, this medication has been linked to serious infections; higher rate of mortality; malignancy and lymphoproliferative disorders; major adverse cardiovascular events; thrombosis; thrombocytopenia, anemia, and neutropenia; and lipid elevations. Adbry Counseling: I discussed with the patient the risks of tralokinumab including but not limited to eye infection and irritation, cold sores, injection site reactions, worsening of asthma, allergic reactions and increased risk of parasitic infection.  Live vaccines should be avoided while taking tralokinumab. The patient understands that monitoring is required and they must alert us or the primary physician if symptoms of infection or other concerning signs are noted. Hydroquinone Pregnancy And Lactation Text: This medication has not been assigned a Pregnancy Risk Category but animal studies failed to show danger with the topical medication. It is unknown if the medication is excreted in breast milk. Clofazimine Pregnancy And Lactation Text: This medication is Pregnancy Category C and isn't considered safe during pregnancy. It is excreted in breast milk. 5-Fu Counseling: 5-Fluorouracil Counseling:  I discussed with the patient the risks of 5-fluorouracil including but not limited to erythema, scaling, itching, weeping, crusting, and pain. Otezla Pregnancy And Lactation Text: This medication is Pregnancy Category C and it isn't known if it is safe during pregnancy. It is unknown if it is excreted in breast milk. Cyclophosphamide Pregnancy And Lactation Text: This medication is Pregnancy Category D and it isn't considered safe during pregnancy. This medication is excreted in breast milk. Bactrim Pregnancy And Lactation Text: This medication is Pregnancy Category D and is known to cause fetal risk.  It is also excreted in breast milk. Olumiant Counseling: I discussed with the patient the risks of Olumiant therapy including but not limited to upper respiratory tract infections, shingles, cold sores, and nausea. Live vaccines should be avoided.  This medication has been linked to serious infections; higher rate of mortality; malignancy and lymphoproliferative disorders; major adverse cardiovascular events; thrombosis; gastrointestinal perforations; neutropenia; lymphopenia; anemia; liver enzyme elevations; and lipid elevations. SSKI Counseling:  I discussed with the patient the risks of SSKI including but not limited to thyroid abnormalities, metallic taste, GI upset, fever, headache, acne, arthralgias, paraesthesias, lymphadenopathy, easy bleeding, arrhythmias, and allergic reaction. Bexarotene Counseling:  I discussed with the patient the risks of bexarotene including but not limited to hair loss, dry lips/skin/eyes, liver abnormalities, hyperlipidemia, pancreatitis, depression/suicidal ideation, photosensitivity, drug rash/allergic reactions, hypothyroidism, anemia, leukopenia, infection, cataracts, and teratogenicity.  Patient understands that they will need regular blood tests to check lipid profile, liver function tests, white blood cell count, thyroid function tests and pregnancy test if applicable. Enbrel Counseling:  I discussed with the patient the risks of etanercept including but not limited to myelosuppression, immunosuppression, autoimmune hepatitis, demyelinating diseases, lymphoma, and infections.  The patient understands that monitoring is required including a PPD at baseline and must alert us or the primary physician if symptoms of infection or other concerning signs are noted. Finasteride Pregnancy And Lactation Text: This medication is absolutely contraindicated during pregnancy. It is unknown if it is excreted in breast milk. Solaraze Counseling:  I discussed with the patient the risks of Solaraze including but not limited to erythema, scaling, itching, weeping, crusting, and pain. Ketoconazole Pregnancy And Lactation Text: This medication is Pregnancy Category C and it isn't know if it is safe during pregnancy. It is also excreted in breast milk and breast feeding isn't recommended. Niacinamide Counseling: I recommended taking niacin or niacinamide, also know as vitamin B3, twice daily. Recent evidence suggests that taking vitamin B3 (500 mg twice daily) can reduce the risk of actinic keratoses and non-melanoma skin cancers. Side effects of vitamin B3 include flushing and headache. Tetracycline Counseling: Patient counseled regarding possible photosensitivity and increased risk for sunburn.  Patient instructed to avoid sunlight, if possible.  When exposed to sunlight, patients should wear protective clothing, sunglasses, and sunscreen.  The patient was instructed to call the office immediately if the following severe adverse effects occur:  hearing changes, easy bruising/bleeding, severe headache, or vision changes.  The patient verbalized understanding of the proper use and possible adverse effects of tetracycline.  All of the patient's questions and concerns were addressed. Patient understands to avoid pregnancy while on therapy due to potential birth defects. Colchicine Counseling:  Patient counseled regarding adverse effects including but not limited to stomach upset (nausea, vomiting, stomach pain, or diarrhea).  Patient instructed to limit alcohol consumption while taking this medication.  Colchicine may reduce blood counts especially with prolonged use.  The patient understands that monitoring of kidney function and blood counts may be required, especially at baseline. The patient verbalized understanding of the proper use and possible adverse effects of colchicine.  All of the patient's questions and concerns were addressed. Xelimtchellz Pregnancy And Lactation Text: This medication is Pregnancy Category D and is not considered safe during pregnancy.  The risk during breast feeding is also uncertain. Metronidazole Pregnancy And Lactation Text: This medication is Pregnancy Category B and considered safe during pregnancy.  It is also excreted in breast milk. Benzoyl Peroxide Counseling: Patient counseled that medicine may cause skin irritation and bleach clothing.  In the event of skin irritation, the patient was advised to reduce the amount of the drug applied or use it less frequently.   The patient verbalized understanding of the proper use and possible adverse effects of benzoyl peroxide.  All of the patient's questions and concerns were addressed. Opzelura Pregnancy And Lactation Text: There is insufficient data to evaluate drug-associated risk for major birth defects, miscarriage, or other adverse maternal or fetal outcomes.  There is a pregnancy registry that monitors pregnancy outcomes in pregnant persons exposed to the medication during pregnancy.  It is unknown if this medication is excreted in breast milk.  Do not breastfeed during treatment and for about 4 weeks after the last dose. Skyrizi Counseling: I discussed with the patient the risks of risankizumab-rzaa including but not limited to immunosuppression, and serious infections.  The patient understands that monitoring is required including a PPD at baseline and must alert us or the primary physician if symptoms of infection or other concerning signs are noted. Cyclosporine Counseling:  I discussed with the patient the risks of cyclosporine including but not limited to hypertension, gingival hyperplasia,myelosuppression, immunosuppression, liver damage, kidney damage, neurotoxicity, lymphoma, and serious infections. The patient understands that monitoring is required including baseline blood pressure, CBC, CMP, lipid panel and uric acid, and then 1-2 times monthly CMP and blood pressure. Oxybutynin Counseling:  I discussed with the patient the risks of oxybutynin including but not limited to skin rash, drowsiness, dry mouth, difficulty urinating, and blurred vision. Imiquimod Counseling:  I discussed with the patient the risks of imiquimod including but not limited to erythema, scaling, itching, weeping, crusting, and pain.  Patient understands that the inflammatory response to imiquimod is variable from person to person and was educated regarded proper titration schedule.  If flu-like symptoms develop, patient knows to discontinue the medication and contact us. 5-Fu Pregnancy And Lactation Text: This medication is Pregnancy Category X and contraindicated in pregnancy and in women who may become pregnant. It is unknown if this medication is excreted in breast milk. Opioid Counseling: I discussed with the patient the potential side effects of opioids including but not limited to addiction, altered mental status, and depression. I stressed avoiding alcohol, benzodiazepines, muscle relaxants and sleep aids unless specifically okayed by a physician. The patient verbalized understanding of the proper use and possible adverse effects of opioids. All of the patient's questions and concerns were addressed. They were instructed to flush the remaining pills down the toilet if they did not need them for pain. Adbry Pregnancy And Lactation Text: It is unknown if this medication will adversely affect pregnancy or breast feeding. Cephalexin Counseling: I counseled the patient regarding use of cephalexin as an antibiotic for prophylactic and/or therapeutic purposes. Cephalexin (commonly prescribed under brand name Keflex) is a cephalosporin antibiotic which is active against numerous classes of bacteria, including most skin bacteria. Side effects may include nausea, diarrhea, gastrointestinal upset, rash, hives, yeast infections, and in rare cases, hepatitis, kidney disease, seizures, fever, confusion, neurologic symptoms, and others. Patients with severe allergies to penicillin medications are cautioned that there is about a 10% incidence of cross-reactivity with cephalosporins. When possible, patients with penicillin allergies should use alternatives to cephalosporins for antibiotic therapy. Sski Pregnancy And Lactation Text: This medication is Pregnancy Category D and isn't considered safe during pregnancy. It is excreted in breast milk. Bexarotene Pregnancy And Lactation Text: This medication is Pregnancy Category X and should not be given to women who are pregnant or may become pregnant. This medication should not be used if you are breast feeding. Olumiant Pregnancy And Lactation Text: Based on animal studies, Olumiant may cause embryo-fetal harm when administered to pregnant women.  The medication should not be used in pregnancy.  Breastfeeding is not recommended during treatment. Albendazole Counseling:  I discussed with the patient the risks of albendazole including but not limited to cytopenia, kidney damage, nausea/vomiting and severe allergy.  The patient understands that this medication is being used in an off-label manner. Minocycline Counseling: Patient advised regarding possible photosensitivity and discoloration of the teeth, skin, lips, tongue and gums.  Patient instructed to avoid sunlight, if possible.  When exposed to sunlight, patients should wear protective clothing, sunglasses, and sunscreen.  The patient was instructed to call the office immediately if the following severe adverse effects occur:  hearing changes, easy bruising/bleeding, severe headache, or vision changes.  The patient verbalized understanding of the proper use and possible adverse effects of minocycline.  All of the patient's questions and concerns were addressed. Benzoyl Peroxide Pregnancy And Lactation Text: This medication is Pregnancy Category C. It is unknown if benzoyl peroxide is excreted in breast milk. Gabapentin Counseling: I discussed with the patient the risks of gabapentin including but not limited to dizziness, somnolence, fatigue and ataxia. Picato Counseling:  I discussed with the patient the risks of Picato including but not limited to erythema, scaling, itching, weeping, crusting, and pain. Terbinafine Counseling: Patient counseling regarding adverse effects of terbinafine including but not limited to headache, diarrhea, rash, upset stomach, liver function test abnormalities, itching, taste/smell disturbance, nausea, abdominal pain, and flatulence.  There is a rare possibility of liver failure that can occur when taking terbinafine.  The patient understands that a baseline LFT and kidney function test may be required. The patient verbalized understanding of the proper use and possible adverse effects of terbinafine.  All of the patient's questions and concerns were addressed. Niacinamide Pregnancy And Lactation Text: These medications are considered safe during pregnancy. Topical Sulfur Applications Counseling: Topical Sulfur Counseling: Patient counseled that this medication may cause skin irritation or allergic reactions.  In the event of skin irritation, the patient was advised to reduce the amount of the drug applied or use it less frequently.   The patient verbalized understanding of the proper use and possible adverse effects of topical sulfur application.  All of the patient's questions and concerns were addressed. Solaraze Pregnancy And Lactation Text: This medication is Pregnancy Category B and is considered safe. There is some data to suggest avoiding during the third trimester. It is unknown if this medication is excreted in breast milk. Cephalexin Pregnancy And Lactation Text: This medication is Pregnancy Category B and considered safe during pregnancy.  It is also excreted in breast milk but can be used safely for shorter doses. Xolair Counseling:  Patient informed of potential adverse effects including but not limited to fever, muscle aches, rash and allergic reactions.  The patient verbalized understanding of the proper use and possible adverse effects of Xolair.  All of the patient's questions and concerns were addressed. Cibinqo Counseling: I discussed with the patient the risks of Cibinqo therapy including but not limited to common cold, nausea, headache, cold sores, increased blood CPK levels, dizziness, UTIs, fatigue, acne, and vomitting. Live vaccines should be avoided.  This medication has been linked to serious infections; higher rate of mortality; malignancy and lymphoproliferative disorders; major adverse cardiovascular events; thrombosis; thrombocytopenia and lymphopenia; lipid elevations; and retinal detachment. Drysol Counseling:  I discussed with the patient the risks of drysol/aluminum chloride including but not limited to skin rash, itching, irritation, burning. Albendazole Pregnancy And Lactation Text: This medication is Pregnancy Category C and it isn't known if it is safe during pregnancy. It is also excreted in breast milk. Opioid Pregnancy And Lactation Text: These medications can lead to premature delivery and should be avoided during pregnancy. These medications are also present in breast milk in small amounts. Thalidomide Counseling: I discussed with the patient the risks of thalidomide including but not limited to birth defects, anxiety, weakness, chest pain, dizziness, cough and severe allergy. Detail Level: Simple Rinvoq Counseling: I discussed with the patient the risks of Rinvoq therapy including but not limited to upper respiratory tract infections, shingles, cold sores, bronchitis, nausea, cough, fever, acne, and headache. Live vaccines should be avoided.  This medication has been linked to serious infections; higher rate of mortality; malignancy and lymphoproliferative disorders; major adverse cardiovascular events; thrombosis; thrombocytopenia, anemia, and neutropenia; lipid elevations; liver enzyme elevations; and gastrointestinal perforations. Cyclosporine Pregnancy And Lactation Text: This medication is Pregnancy Category C and it isn't know if it is safe during pregnancy. This medication is excreted in breast milk. Nsaids Counseling: NSAID Counseling: I discussed with the patient that NSAIDs should be taken with food. Prolonged use of NSAIDs can result in the development of stomach ulcers.  Patient advised to stop taking NSAIDs if abdominal pain occurs.  The patient verbalized understanding of the proper use and possible adverse effects of NSAIDs.  All of the patient's questions and concerns were addressed. Topical Sulfur Applications Pregnancy And Lactation Text: This medication is Pregnancy Category C and has an unknown safety profile during pregnancy. It is unknown if this topical medication is excreted in breast milk. Terbinafine Pregnancy And Lactation Text: This medication is Pregnancy Category B and is considered safe during pregnancy. It is also excreted in breast milk and breast feeding isn't recommended. Isotretinoin Counseling: Patient should get monthly blood tests, not donate blood, not drive at night if vision affected, not share medication, and not undergo elective surgery for 6 months after tx completed. Side effects reviewed, pt to contact office should one occur. Carac Counseling:  I discussed with the patient the risks of Carac including but not limited to erythema, scaling, itching, weeping, crusting, and pain. Topical Retinoid counseling:  Patient advised to apply a pea-sized amount only at bedtime and wait 30 minutes after washing their face before applying.  If too drying, patient may add a non-comedogenic moisturizer. The patient verbalized understanding of the proper use and possible adverse effects of retinoids.  All of the patient's questions and concerns were addressed. Cibinqo Pregnancy And Lactation Text: It is unknown if this medication will adversely affect pregnancy or breast feeding.  You should not take this medication if you are currently pregnant or planning a pregnancy or while breastfeeding. Dapsone Counseling: I discussed with the patient the risks of dapsone including but not limited to hemolytic anemia, agranulocytosis, rashes, methemoglobinemia, kidney failure, peripheral neuropathy, headaches, GI upset, and liver toxicity.  Patients who start dapsone require monitoring including baseline LFTs and weekly CBCs for the first month, then every month thereafter.  The patient verbalized understanding of the proper use and possible adverse effects of dapsone.  All of the patient's questions and concerns were addressed. Humira Counseling:  I discussed with the patient the risks of adalimumab including but not limited to myelosuppression, immunosuppression, autoimmune hepatitis, demyelinating diseases, lymphoma, and serious infections.  The patient understands that monitoring is required including a PPD at baseline and must alert us or the primary physician if symptoms of infection or other concerning signs are noted. Fluconazole Counseling:  Patient counseled regarding adverse effects of fluconazole including but not limited to headache, diarrhea, nausea, upset stomach, liver function test abnormalities, taste disturbance, and stomach pain.  There is a rare possibility of liver failure that can occur when taking fluconazole.  The patient understands that monitoring of LFTs and kidney function test may be required, especially at baseline. The patient verbalized understanding of the proper use and possible adverse effects of fluconazole.  All of the patient's questions and concerns were addressed. Klisyri Counseling:  I discussed with the patient the risks of Klisyri including but not limited to erythema, scaling, itching, weeping, crusting, and pain. Propranolol Counseling:  I discussed with the patient the risks of propranolol including but not limited to low heart rate, low blood pressure, low blood sugar, restlessness and increased cold sensitivity. They should call the office if they experience any of these side effects. Stelara Counseling:  I discussed with the patient the risks of ustekinumab including but not limited to immunosuppression, malignancy, posterior leukoencephalopathy syndrome, and serious infections.  The patient understands that monitoring is required including a PPD at baseline and must alert us or the primary physician if symptoms of infection or other concerning signs are noted. Clindamycin Counseling: I counseled the patient regarding use of clindamycin as an antibiotic for prophylactic and/or therapeutic purposes. Clindamycin is active against numerous classes of bacteria, including skin bacteria. Side effects may include nausea, diarrhea, gastrointestinal upset, rash, hives, yeast infections, and in rare cases, colitis. Xolair Pregnancy And Lactation Text: This medication is Pregnancy Category B and is considered safe during pregnancy. This medication is excreted in breast milk. Rinvoq Pregnancy And Lactation Text: Based on animal studies, Rinvoq may cause embryo-fetal harm when administered to pregnant women.  The medication should not be used in pregnancy.  Breastfeeding is not recommended during treatment and for 6 days after the last dose. Nsaids Pregnancy And Lactation Text: These medications are considered safe up to 30 weeks gestation. It is excreted in breast milk. Ivermectin Counseling:  Patient instructed to take medication on an empty stomach with a full glass of water.  Patient informed of potential adverse effects including but not limited to nausea, diarrhea, dizziness, itching, and swelling of the extremities or lymph nodes.  The patient verbalized understanding of the proper use and possible adverse effects of ivermectin.  All of the patient's questions and concerns were addressed. Methotrexate Counseling:  Patient counseled regarding adverse effects of methotrexate including but not limited to nausea, vomiting, abnormalities in liver function tests. Patients may develop mouth sores, rash, diarrhea, and abnormalities in blood counts. The patient understands that monitoring is required including LFT's and blood counts.  There is a rare possibility of scarring of the liver and lung problems that can occur when taking methotrexate. Persistent nausea, loss of appetite, pale stools, dark urine, cough, and shortness of breath should be reported immediately. Patient advised to discontinue methotrexate treatment at least three months before attempting to become pregnant.  I discussed the need for folate supplements while taking methotrexate.  These supplements can decrease side effects during methotrexate treatment. The patient verbalized understanding of the proper use and possible adverse effects of methotrexate.  All of the patient's questions and concerns were addressed. Glycopyrrolate Counseling:  I discussed with the patient the risks of glycopyrrolate including but not limited to skin rash, drowsiness, dry mouth, difficulty urinating, and blurred vision. Wartpeel Counseling:  I discussed with the patient the risks of Wartpeel including but not limited to erythema, scaling, itching, weeping, crusting, and pain. Quinolones Counseling:  I discussed with the patient the risks of fluoroquinolones including but not limited to GI upset, allergic reaction, drug rash, diarrhea, dizziness, photosensitivity, yeast infections, liver function test abnormalities, tendonitis/tendon rupture. Arava Counseling:  Patient counseled regarding adverse effects of Arava including but not limited to nausea, vomiting, abnormalities in liver function tests. Patients may develop mouth sores, rash, diarrhea, and abnormalities in blood counts. The patient understands that monitoring is required including LFTs and blood counts.  There is a rare possibility of scarring of the liver and lung problems that can occur when taking methotrexate. Persistent nausea, loss of appetite, pale stools, dark urine, cough, and shortness of breath should be reported immediately. Patient advised to discontinue Arava treatment and consult with a physician prior to attempting conception. The patient will have to undergo a treatment to eliminate Arava from the body prior to conception. Isotretinoin Pregnancy And Lactation Text: This medication is Pregnancy Category X and is considered extremely dangerous during pregnancy. It is unknown if it is excreted in breast milk. Protopic Counseling: Patient may experience a mild burning sensation during topical application. Protopic is not approved in children less than 2 years of age. There have been case reports of hematologic and skin malignancies in patients using topical calcineurin inhibitors although causality is questionable. Klisyri Pregnancy And Lactation Text: It is unknown if this medication can harm a developing fetus or if it is excreted in breast milk. Cimzia Counseling:  I discussed with the patient the risks of Cimzia including but not limited to immunosuppression, allergic reactions and infections.  The patient understands that monitoring is required including a PPD at baseline and must alert us or the primary physician if symptoms of infection or other concerning signs are noted. Elidel Counseling: Patient may experience a mild burning sensation during topical application. Elidel is not approved in children less than 2 years of age. There have been case reports of hematologic and skin malignancies in patients using topical calcineurin inhibitors although causality is questionable. Dapsone Pregnancy And Lactation Text: This medication is Pregnancy Category C and is not considered safe during pregnancy or breast feeding. Clindamycin Pregnancy And Lactation Text: This medication can be used in pregnancy if certain situations. Clindamycin is also present in breast milk. Propranolol Pregnancy And Lactation Text: This medication is Pregnancy Category C and it isn't known if it is safe during pregnancy. It is excreted in breast milk. Methotrexate Pregnancy And Lactation Text: This medication is Pregnancy Category X and is known to cause fetal harm. This medication is excreted in breast milk. Rituxan Counseling:  I discussed with the patient the risks of Rituxan infusions. Side effects can include infusion reactions, severe drug rashes including mucocutaneous reactions, reactivation of latent hepatitis and other infections and rarely progressive multifocal leukoencephalopathy.  All of the patient's questions and concerns were addressed. Tranexamic Acid Counseling:  Patient advised of the small risk of bleeding problems with tranexamic acid. They were also instructed to call if they developed any nausea, vomiting or diarrhea. All of the patient's questions and concerns were addressed. Cimetidine Counseling:  I discussed with the patient the risks of Cimetidine including but not limited to gynecomastia, headache, diarrhea, nausea, drowsiness, arrhythmias, pancreatitis, skin rashes, psychosis, bone marrow suppression and kidney toxicity. High Dose Vitamin A Counseling: Side effects reviewed, pt to contact office should one occur. Glycopyrrolate Pregnancy And Lactation Text: This medication is Pregnancy Category B and is considered safe during pregnancy. It is unknown if it is excreted breast milk. Odomzo Counseling- I discussed with the patient the risks of Odomzo including but not limited to nausea, vomiting, diarrhea, constipation, weight loss, changes in the sense of taste, decreased appetite, muscle spasms, and hair loss.  The patient verbalized understanding of the proper use and possible adverse effects of Odomzo.  All of the patient's questions and concerns were addressed. Azathioprine Counseling:  I discussed with the patient the risks of azathioprine including but not limited to myelosuppression, immunosuppression, hepatotoxicity, lymphoma, and infections.  The patient understands that monitoring is required including baseline LFTs, Creatinine, possible TPMP genotyping and weekly CBCs for the first month and then every 2 weeks thereafter.  The patient verbalized understanding of the proper use and possible adverse effects of azathioprine.  All of the patient's questions and concerns were addressed. Griseofulvin Counseling:  I discussed with the patient the risks of griseofulvin including but not limited to photosensitivity, cytopenia, liver damage, nausea/vomiting and severe allergy.  The patient understands that this medication is best absorbed when taken with a fatty meal (e.g., ice cream or french fries). Dutasteride Counseling: Dustasteride Counseling:  I discussed with the patient the risks of use of dutasteride including but not limited to decreased libido, decreased ejaculate volume, and gynecomastia. Women who can become pregnant should not handle medication.  All of the patient's questions and concerns were addressed. Calcipotriene Counseling:  I discussed with the patient the risks of calcipotriene including but not limited to erythema, scaling, itching, and irritation. Tazorac Counseling:  Patient advised that medication is irritating and drying.  Patient may need to apply sparingly and wash off after an hour before eventually leaving it on overnight.  The patient verbalized understanding of the proper use and possible adverse effects of tazorac.  All of the patient's questions and concerns were addressed. Cimzia Pregnancy And Lactation Text: This medication crosses the placenta but can be considered safe in certain situations. Cimzia may be excreted in breast milk. Ilumya Counseling: I discussed with the patient the risks of tildrakizumab including but not limited to immunosuppression, malignancy, posterior leukoencephalopathy syndrome, and serious infections.  The patient understands that monitoring is required including a PPD at baseline and must alert us or the primary physician if symptoms of infection or other concerning signs are noted. Protopic Pregnancy And Lactation Text: This medication is Pregnancy Category C. It is unknown if this medication is excreted in breast milk when applied topically. Taltz Counseling: I discussed with the patient the risks of ixekizumab including but not limited to immunosuppression, serious infections, worsening of inflammatory bowel disease and drug reactions.  The patient understands that monitoring is required including a PPD at baseline and must alert us or the primary physician if symptoms of infection or other concerning signs are noted. Prednisone Counseling:  I discussed with the patient the risks of prolonged use of prednisone including but not limited to weight gain, insomnia, osteoporosis, mood changes, diabetes, susceptibility to infection, glaucoma and high blood pressure.  In cases where prednisone use is prolonged, patients should be monitored with blood pressure checks, serum glucose levels and an eye exam.  Additionally, the patient may need to be placed on GI prophylaxis, PCP prophylaxis, and calcium and vitamin D supplementation and/or a bisphosphonate.  The patient verbalized understanding of the proper use and the possible adverse effects of prednisone.  All of the patient's questions and concerns were addressed. Minoxidil Counseling: Minoxidil is a topical medication which can increase blood flow where it is applied. It is uncertain how this medication increases hair growth. Side effects are uncommon and include stinging and allergic reactions. Birth Control Pills Counseling: Birth Control Pill Counseling: I discussed with the patient the potential side effects of OCPs including but not limited to increased risk of stroke, heart attack, thrombophlebitis, deep venous thrombosis, hepatic adenomas, breast changes, GI upset, headaches, and depression.  The patient verbalized understanding of the proper use and possible adverse effects of OCPs. All of the patient's questions and concerns were addressed. Doxycycline Counseling:  Patient counseled regarding possible photosensitivity and increased risk for sunburn.  Patient instructed to avoid sunlight, if possible.  When exposed to sunlight, patients should wear protective clothing, sunglasses, and sunscreen.  The patient was instructed to call the office immediately if the following severe adverse effects occur:  hearing changes, easy bruising/bleeding, severe headache, or vision changes.  The patient verbalized understanding of the proper use and possible adverse effects of doxycycline.  All of the patient's questions and concerns were addressed. Aklief counseling:  Patient advised to apply a pea-sized amount only at bedtime and wait 30 minutes after washing their face before applying.  If too drying, patient may add a non-comedogenic moisturizer.  The most commonly reported side effects including irritation, redness, scaling, dryness, stinging, burning, itching, and increased risk of sunburn.  The patient verbalized understanding of the proper use and possible adverse effects of retinoids.  All of the patient's questions and concerns were addressed. Tranexamic Acid Pregnancy And Lactation Text: It is unknown if this medication is safe during pregnancy or breast feeding. Rituxan Pregnancy And Lactation Text: This medication is Pregnancy Category C and it isn't know if it is safe during pregnancy. It is unknown if this medication is excreted in breast milk but similar antibodies are known to be excreted. Calcipotriene Pregnancy And Lactation Text: This medication has not been proven safe during pregnancy. It is unknown if this medication is excreted in breast milk. Dutasteride Pregnancy And Lactation Text: This medication is absolutely contraindicated in women, especially during pregnancy and breast feeding. Feminization of male fetuses is possible if taking while pregnant. Qbrexza Counseling:  I discussed with the patient the risks of Qbrexza including but not limited to headache, mydriasis, blurred vision, dry eyes, nasal dryness, dry mouth, dry throat, dry skin, urinary hesitation, and constipation.  Local skin reactions including erythema, burning, stinging, and itching can also occur. Griseofulvin Pregnancy And Lactation Text: This medication is Pregnancy Category X and is known to cause serious birth defects. It is unknown if this medication is excreted in breast milk but breast feeding should be avoided. High Dose Vitamin A Pregnancy And Lactation Text: High dose vitamin A therapy is contraindicated during pregnancy and breast feeding. Rifampin Counseling: I discussed with the patient the risks of rifampin including but not limited to liver damage, kidney damage, red-orange body fluids, nausea/vomiting and severe allergy. Winlevi Counseling:  I discussed with the patient the risks of topical clascoterone including but not limited to erythema, scaling, itching, and stinging. Patient voiced their understanding. Doxepin Counseling:  Patient advised that the medication is sedating and not to drive a car after taking this medication. Patient informed of potential adverse effects including but not limited to dry mouth, urinary retention, and blurry vision.  The patient verbalized understanding of the proper use and possible adverse effects of doxepin.  All of the patient's questions and concerns were addressed. Tazorac Pregnancy And Lactation Text: This medication is not safe during pregnancy. It is unknown if this medication is excreted in breast milk. Doxycycline Pregnancy And Lactation Text: This medication is Pregnancy Category D and not consider safe during pregnancy. It is also excreted in breast milk but is considered safe for shorter treatment courses. Hydroxychloroquine Counseling:  I discussed with the patient that a baseline ophthalmologic exam is needed at the start of therapy and every year thereafter while on therapy. A CBC may also be warranted for monitoring.  The side effects of this medication were discussed with the patient, including but not limited to agranulocytosis, aplastic anemia, seizures, rashes, retinopathy, and liver toxicity. Patient instructed to call the office should any adverse effect occur.  The patient verbalized understanding of the proper use and possible adverse effects of Plaquenil.  All the patient's questions and concerns were addressed. Cosentyx Counseling:  I discussed with the patient the risks of Cosentyx including but not limited to worsening of Crohn's disease, immunosuppression, allergic reactions and infections.  The patient understands that monitoring is required including a PPD at baseline and must alert us or the primary physician if symptoms of infection or other concerning signs are noted. Siliq Counseling:  I discussed with the patient the risks of Siliq including but not limited to new or worsening depression, suicidal thoughts and behavior, immunosuppression, malignancy, posterior leukoencephalopathy syndrome, and serious infections.  The patient understands that monitoring is required including a PPD at baseline and must alert us or the primary physician if symptoms of infection or other concerning signs are noted. There is also a special program designed to monitor depression which is required with Siliq. Cellcept Counseling:  I discussed with the patient the risks of mycophenolate mofetil including but not limited to infection/immunosuppression, GI upset, hypokalemia, hypercholesterolemia, bone marrow suppression, lymphoproliferative disorders, malignancy, GI ulceration/bleed/perforation, colitis, interstitial lung disease, kidney failure, progressive multifocal leukoencephalopathy, and birth defects.  The patient understands that monitoring is required including a baseline creatinine and regular CBC testing. In addition, patient must alert us immediately if symptoms of infection or other concerning signs are noted. Eucrisa Counseling: Patient may experience a mild burning sensation during topical application. Eucrisa is not approved in children less than 2 years of age. Valtrex Counseling: I discussed with the patient the risks of valacyclovir including but not limited to kidney damage, nausea, vomiting and severe allergy.  The patient understands that if the infection seems to be worsening or is not improving, they are to call. Aklief Pregnancy And Lactation Text: It is unknown if this medication is safe to use during pregnancy.  It is unknown if this medication is excreted in breast milk.  Breastfeeding women should use the topical cream on the smallest area of the skin for the shortest time needed while breastfeeding.  Do not apply to nipple and areola. Birth Control Pills Pregnancy And Lactation Text: This medication should be avoided if pregnant and for the first 30 days post-partum. Azithromycin Counseling:  I discussed with the patient the risks of azithromycin including but not limited to GI upset, allergic reaction, drug rash, diarrhea, and yeast infections. Oral Minoxidil Counseling- I discussed with the patient the risks of oral minoxidil including but not limited to shortness of breath, swelling of the feet or ankles, dizziness, lightheadedness, unwanted hair growth and allergic reaction.  The patient verbalized understanding of the proper use and possible adverse effects of oral minoxidil.  All of the patient's questions and concerns were addressed. Rifampin Pregnancy And Lactation Text: This medication is Pregnancy Category C and it isn't know if it is safe during pregnancy. It is also excreted in breast milk and should not be used if you are breast feeding. Winlevi Pregnancy And Lactation Text: This medication is considered safe during pregnancy and breastfeeding. Topical Clindamycin Counseling: Patient counseled that this medication may cause skin irritation or allergic reactions.  In the event of skin irritation, the patient was advised to reduce the amount of the drug applied or use it less frequently.   The patient verbalized understanding of the proper use and possible adverse effects of clindamycin.  All of the patient's questions and concerns were addressed. Erivedge Counseling- I discussed with the patient the risks of Erivedge including but not limited to nausea, vomiting, diarrhea, constipation, weight loss, changes in the sense of taste, decreased appetite, muscle spasms, and hair loss.  The patient verbalized understanding of the proper use and possible adverse effects of Erivedge.  All of the patient's questions and concerns were addressed. Infliximab Counseling:  I discussed with the patient the risks of infliximab including but not limited to myelosuppression, immunosuppression, autoimmune hepatitis, demyelinating diseases, lymphoma, and serious infections.  The patient understands that monitoring is required including a PPD at baseline and must alert us or the primary physician if symptoms of infection or other concerning signs are noted. Itraconazole Counseling:  I discussed with the patient the risks of itraconazole including but not limited to liver damage, nausea/vomiting, neuropathy, and severe allergy.  The patient understands that this medication is best absorbed when taken with acidic beverages such as non-diet cola or ginger ale.  The patient understands that monitoring is required including baseline LFTs and repeat LFTs at intervals.  The patient understands that they are to contact us or the primary physician if concerning signs are noted. Hydroxychloroquine Pregnancy And Lactation Text: This medication has been shown to cause fetal harm but it isn't assigned a Pregnancy Risk Category. There are small amounts excreted in breast milk. Spironolactone Counseling: Patient advised regarding risks of diarrhea, abdominal pain, hyperkalemia, birth defects (for female patients), liver toxicity and renal toxicity. The patient may need blood work to monitor liver and kidney function and potassium levels while on therapy. The patient verbalized understanding of the proper use and possible adverse effects of spironolactone.  All of the patient's questions and concerns were addressed. Erythromycin Counseling:  I discussed with the patient the risks of erythromycin including but not limited to GI upset, allergic reaction, drug rash, diarrhea, increase in liver enzymes, and yeast infections. Valtrex Pregnancy And Lactation Text: this medication is Pregnancy Category B and is considered safe during pregnancy. This medication is not directly found in breast milk but it's metabolite acyclovir is present. Qbrexza Pregnancy And Lactation Text: There is no available data on Qbrexza use in pregnant women.  There is no available data on Qbrexza use in lactation. Tremfya Counseling: I discussed with the patient the risks of guselkumab including but not limited to immunosuppression, serious infections, worsening of inflammatory bowel disease and drug reactions.  The patient understands that monitoring is required including a PPD at baseline and must alert us or the primary physician if symptoms of infection or other concerning signs are noted. Doxepin Pregnancy And Lactation Text: This medication is Pregnancy Category C and it isn't known if it is safe during pregnancy. It is also excreted in breast milk and breast feeding isn't recommended. Mirvaso Counseling: Mirvaso is a topical medication which can decrease superficial blood flow where applied. Side effects are uncommon and include stinging, redness and allergic reactions.

## 2022-08-17 RX ORDER — FAMOTIDINE 20 MG/1
TABLET, FILM COATED ORAL
Qty: 60 TABLET | Refills: 5 | Status: SHIPPED | OUTPATIENT
Start: 2022-08-17

## 2022-08-17 NOTE — TELEPHONE ENCOUNTER
Medication:   Requested Prescriptions     Pending Prescriptions Disp Refills    famotidine (PEPCID) 20 MG tablet [Pharmacy Med Name: FAMOTIDINE 20 MG TABLET] 60 tablet 0     Sig: TAKE ONE TABLET BY MOUTH TWICE A DAY        Last Filled:  7/18/2022    Patient Phone Number: [phone] (home)     Last appt: 4/25/2022   Next appt: Visit date not found    Last OARRS: No flowsheet data found.

## 2022-09-16 NOTE — TELEPHONE ENCOUNTER
Medication:   Requested Prescriptions     Pending Prescriptions Disp Refills    ibuprofen (ADVIL;MOTRIN) 800 MG tablet [Pharmacy Med Name: IBUPROFEN 800 MG TABLET] 60 tablet 5     Sig: TAKE 1/2 TO 1 TABLET BY MOUTH TWO TIMES A DAY AS NEEDED FOR PAIN WHILE ON PEPCID        Last Filled:  3/18/2022    Patient Phone Number: [phone] (home)     Last appt: 4/25/2022   Next appt: Visit date not found    Last OARRS: No flowsheet data found.

## 2022-09-17 RX ORDER — IBUPROFEN 800 MG/1
TABLET ORAL
Qty: 60 TABLET | Refills: 2 | Status: SHIPPED | OUTPATIENT
Start: 2022-09-17

## 2022-09-17 NOTE — TELEPHONE ENCOUNTER
Have filled 3 months be sure pt has new pcp before out  MD Jered Tang MD  [facility] internal Medicine  [id number] [facility] [address]  [address] [facility] scheduling  [phone]

## 2023-12-26 NOTE — TELEPHONE ENCOUNTER
Patient arrived in a wheelchair, patient states he normally walks with a walker/cane. From [place] visiting with the [place] holiday, staying at moms, is here due to increased abdominal girth due to alcoholic cirrhosis.  He had paracentesis 4 times in the past two months, with the last procedure done December 1.  He is uncomfortable and wants us to drain his ascitic fluid. Denies shortness of breath. Patient states he was here on Saturday, and told come back today to get admitted for procedure    Pt is calling because she was diagnosed with covid yesterday. States the none of her anxiety meds seem to be working right now. States that they were working until she started showing symptoms but she panicked when the symptoms started. States that she she has not been able to sleep since Tuesday night because of the anxiety. She states that she can feel a manic episode coming on and wants to prevent this from happening. She is asking what her options are. States that she has been taking the meds and its not helping.

## 2024-12-12 ENCOUNTER — HOSPITAL ENCOUNTER (EMERGENCY)
Age: 44
Discharge: HOME | End: 2024-12-12
Payer: COMMERCIAL

## 2024-12-12 VITALS
TEMPERATURE: 98.1 F | DIASTOLIC BLOOD PRESSURE: 74 MMHG | WEIGHT: 138.89 LBS | HEART RATE: 81 BPM | SYSTOLIC BLOOD PRESSURE: 128 MMHG | HEIGHT: 65 IN | BODY MASS INDEX: 23.14 KG/M2 | RESPIRATION RATE: 16 BRPM | OXYGEN SATURATION: 94 %

## 2024-12-12 DIAGNOSIS — Z92.89 HISTORY OF DENTAL SURGERY: ICD-10-CM

## 2024-12-12 DIAGNOSIS — G89.18 POSTOPERATIVE PAIN: Primary | ICD-10-CM

## 2024-12-12 PROCEDURE — 6370000000 HC RX 637 (ALT 250 FOR IP): Performed by: PHYSICIAN ASSISTANT

## 2024-12-12 PROCEDURE — 6360000002 HC RX W HCPCS: Performed by: PHYSICIAN ASSISTANT

## 2024-12-12 PROCEDURE — 99284 EMERGENCY DEPT VISIT MOD MDM: CPT

## 2024-12-12 PROCEDURE — 96372 THER/PROPH/DIAG INJ SC/IM: CPT

## 2024-12-12 RX ORDER — BUPRENORPHINE AND NALOXONE 8; 2 MG/1; MG/1
1 FILM, SOLUBLE BUCCAL; SUBLINGUAL SEE ADMIN INSTRUCTIONS
COMMUNITY
Start: 2024-11-25

## 2024-12-12 RX ORDER — KETOROLAC TROMETHAMINE 30 MG/ML
30 INJECTION, SOLUTION INTRAMUSCULAR; INTRAVENOUS ONCE
Status: COMPLETED | OUTPATIENT
Start: 2024-12-12 | End: 2024-12-12

## 2024-12-12 RX ORDER — IBUPROFEN 600 MG/1
600 TABLET, FILM COATED ORAL EVERY 8 HOURS PRN
COMMUNITY
Start: 2024-12-12

## 2024-12-12 RX ORDER — ACETAMINOPHEN 500 MG
1000 TABLET ORAL ONCE
Status: COMPLETED | OUTPATIENT
Start: 2024-12-12 | End: 2024-12-12

## 2024-12-12 RX ORDER — OXYCODONE HYDROCHLORIDE 5 MG/1
5 TABLET ORAL EVERY 6 HOURS PRN
Qty: 4 TABLET | Refills: 0 | Status: SHIPPED | OUTPATIENT
Start: 2024-12-12 | End: 2024-12-13

## 2024-12-12 RX ORDER — OXYCODONE HYDROCHLORIDE 5 MG/1
5 TABLET ORAL ONCE
Status: COMPLETED | OUTPATIENT
Start: 2024-12-12 | End: 2024-12-12

## 2024-12-12 RX ADMIN — KETOROLAC TROMETHAMINE 30 MG: 30 INJECTION, SOLUTION INTRAMUSCULAR at 17:51

## 2024-12-12 RX ADMIN — ACETAMINOPHEN 1000 MG: 500 TABLET ORAL at 17:51

## 2024-12-12 RX ADMIN — OXYCODONE 5 MG: 5 TABLET ORAL at 18:41

## 2024-12-12 ASSESSMENT — PAIN SCALES - GENERAL
PAINLEVEL_OUTOF10: 10
PAINLEVEL_OUTOF10: 10
PAINLEVEL_OUTOF10: 7

## 2024-12-12 ASSESSMENT — PAIN DESCRIPTION - DESCRIPTORS: DESCRIPTORS: DISCOMFORT

## 2024-12-12 ASSESSMENT — PAIN DESCRIPTION - PAIN TYPE: TYPE: ACUTE PAIN

## 2024-12-12 ASSESSMENT — PAIN DESCRIPTION - LOCATION: LOCATION: TEETH

## 2024-12-12 NOTE — ED TRIAGE NOTES
Patient had dental surgery today. Pain RX sent to pharmacy, but pharmacy will not fill due to patient taking suboxones. Patient in pain-numbness wearing off.

## 2024-12-12 NOTE — ED PROVIDER NOTES
**ADVANCED PRACTICE PROVIDER, I HAVE EVALUATED THIS PATIENT**        [facility] EMERGENCY DEPARTMENT  EMERGENCY DEPARTMENT ENCOUNTER      Pt Name: Tim Pickard  MRN:[id number]  Birthdate 1980  Date of evaluation: 12/12/2024  Provider: Ajay Mendez PA-C  Note Started: 6:31 PM EST 12/12/24        Chief Complaint:    Chief Complaint   Patient presents with    Dental Pain     Patient had dental surgery today. Pain RX sent to pharmacy, but pharmacy will not fill due to patient taking suboxones. Patient in pain-numbness wearing off.         Nursing Notes, Past Medical Hx, Past Surgical Hx, Social Hx, Allergies, and Family Hx were all reviewed and agreed with or any disagreements were addressed in the HPI.    HPI: (Location, Duration, Timing, Severity, Quality, Assoc Sx, Context, Modifying factors)    History From: patient          Chief Complaint of pain after dental surgery    This is a  44 y.o. female who presents stating that she did have dental surgery to remove the top teeth earlier today at the [facility].  States that she was discharged home maybe 1 PM.  States that she has tried to  her prescribed medications however reports that the pharmacy did not have confirmation from her dentist that they were aware patient is on Suboxone daily.  She tells us that because of that the pharmacy would not fill her oxycodone 5 mg tablet prescription that was sent from the dentist group.  Patient reports that she has tried the ibuprofen but  did not try the Tylenol so far that was also prescribed to help out with her pain.  States that she is just starting to have a little tingling in the lower lip but no severe pain so far.  Is just concerned that the pain will become too much for her.  Patient indicates that her pain management group that prescribes the Suboxone has instructed her to only take half of her typical Suboxone dose starting yesterday and today and as long as she is potentially

## 2024-12-12 NOTE — DISCHARGE INSTRUCTIONS
As discussed, continue on the half dose daily of your regular Suboxone as you have reported your pain specialist to have instructed you as long as you are taking extra medicine for pain.  Do take the ibuprofen and acetaminophen as prescribed together every 6 hours as needed for pain.  If pain uncontrolled and it is needed, may also use the oxycodone for very short period of time as discussed with you.  Make sure that you are not at home alone when doing so and that the Narcan/naloxone rescue medicine is in the hands of somebody with you that can use it if you had any acute issues with breathing.  Call and follow-up with your dentist again tomorrow concerning additional needs.  Return to the ER for any emergency worsening or concern.

## 2025-01-15 NOTE — PROGRESS NOTES
swelling  As reported by the patient, on examination no axillary lymphadenopathy appreciated.  Imaging for further evaluation  - HOSSEIN DIGITAL DIAGNOSTIC W OR WO CAD BILATERAL; Future  - US BREAST COMPLETE LEFT; Future  - US BREAST COMPLETE RIGHT; Future        Return in about 6 months (around 7/16/2025).       Subjective   HPI    Review of Systems   Constitutional: Negative.  Negative for chills and fever.   HENT: Negative.     Eyes: Negative.  Negative for discharge.   Respiratory: Negative.  Negative for shortness of breath.    Cardiovascular: Negative.  Negative for chest pain and palpitations.   Gastrointestinal: Negative.  Negative for abdominal pain.   Endocrine: Negative.    Genitourinary: Negative.    Musculoskeletal: Negative.    Skin: Negative.    Allergic/Immunologic: Negative.    Neurological: Negative.  Negative for dizziness and headaches.   Hematological: Negative.    Psychiatric/Behavioral: Negative.            Objective   Physical Exam  Vitals reviewed.   Constitutional:       Appearance: Normal appearance.   HENT:      Head: Normocephalic.   Eyes:      Extraocular Movements: Extraocular movements intact.      Pupils: Pupils are equal, round, and reactive to light.   Cardiovascular:      Rate and Rhythm: Normal rate.      Heart sounds: Normal heart sounds. No murmur heard.  Pulmonary:      Effort: Pulmonary effort is normal.      Breath sounds: Normal breath sounds.   Chest:   Breasts:     Right: No swelling, bleeding, inverted nipple, mass, nipple discharge, skin change or tenderness.      Left: No swelling, bleeding, inverted nipple, mass, nipple discharge, skin change or tenderness.   Abdominal:      General: Bowel sounds are normal.      Palpations: Abdomen is soft.   Musculoskeletal:         General: Normal range of motion.   Lymphadenopathy:      Upper Body:      Right upper body: No axillary adenopathy.      Left upper body: No axillary adenopathy.   Skin:     General: Skin is warm.

## 2025-01-16 ENCOUNTER — OFFICE VISIT (OUTPATIENT)
Dept: INTERNAL MEDICINE CLINIC | Age: 45
End: 2025-01-16

## 2025-01-16 VITALS
HEART RATE: 84 BPM | BODY MASS INDEX: 22.47 KG/M2 | SYSTOLIC BLOOD PRESSURE: 102 MMHG | DIASTOLIC BLOOD PRESSURE: 70 MMHG | OXYGEN SATURATION: 99 % | WEIGHT: 135 LBS

## 2025-01-16 DIAGNOSIS — N64.4 BREAST PAIN: ICD-10-CM

## 2025-01-16 DIAGNOSIS — F19.21 DRUG ADDICTION IN REMISSION (HCC): ICD-10-CM

## 2025-01-16 DIAGNOSIS — F60.3 BORDERLINE PERSONALITY DISORDER (HCC): ICD-10-CM

## 2025-01-16 DIAGNOSIS — M79.89 RIGHT AXILLARY SWELLING: ICD-10-CM

## 2025-01-16 DIAGNOSIS — M75.02 ADHESIVE CAPSULITIS OF LEFT SHOULDER: Primary | ICD-10-CM

## 2025-01-16 DIAGNOSIS — F43.10 PTSD (POST-TRAUMATIC STRESS DISORDER): ICD-10-CM

## 2025-01-16 SDOH — ECONOMIC STABILITY: FOOD INSECURITY: WITHIN THE PAST 12 MONTHS, YOU WORRIED THAT YOUR FOOD WOULD RUN OUT BEFORE YOU GOT MONEY TO BUY MORE.: NEVER TRUE

## 2025-01-16 SDOH — ECONOMIC STABILITY: FOOD INSECURITY: WITHIN THE PAST 12 MONTHS, THE FOOD YOU BOUGHT JUST DIDN'T LAST AND YOU DIDN'T HAVE MONEY TO GET MORE.: NEVER TRUE

## 2025-01-16 ASSESSMENT — PATIENT HEALTH QUESTIONNAIRE - PHQ9
SUM OF ALL RESPONSES TO PHQ QUESTIONS 1-9: 0
SUM OF ALL RESPONSES TO PHQ QUESTIONS 1-9: 0
SUM OF ALL RESPONSES TO PHQ9 QUESTIONS 1 & 2: 0
SUM OF ALL RESPONSES TO PHQ QUESTIONS 1-9: 0
1. LITTLE INTEREST OR PLEASURE IN DOING THINGS: NOT AT ALL
2. FEELING DOWN, DEPRESSED OR HOPELESS: NOT AT ALL
SUM OF ALL RESPONSES TO PHQ QUESTIONS 1-9: 0

## 2025-01-27 ENCOUNTER — HOSPITAL ENCOUNTER (OUTPATIENT)
Dept: PHYSICAL THERAPY | Age: 45
Setting detail: THERAPIES SERIES
Discharge: HOME | End: 2025-01-27
Payer: COMMERCIAL

## 2025-01-27 DIAGNOSIS — M25.512 LEFT SHOULDER PAIN, UNSPECIFIED CHRONICITY: Primary | ICD-10-CM

## 2025-01-27 PROCEDURE — 97110 THERAPEUTIC EXERCISES: CPT

## 2025-01-27 PROCEDURE — 97140 MANUAL THERAPY 1/> REGIONS: CPT

## 2025-01-27 PROCEDURE — 97161 PT EVAL LOW COMPLEX 20 MIN: CPT

## 2025-01-27 PROCEDURE — 97112 NEUROMUSCULAR REEDUCATION: CPT

## 2025-01-27 NOTE — PLAN OF CARE
[facility] - Outpatient Rehabilitation and Therapy: [address], [place] [address] office: [phone] fax: [phone]     Physical Therapy Initial Evaluation Certification      Dear Tomeka Torres MD ,    We had the pleasure of evaluating the following patient for physical therapy services at [facility] Outpatient Physical Therapy.  A summary of our findings can be found in the initial assessment below.  This includes our plan of care.  If you have any questions or concerns regarding these findings, please do not hesitate to contact me at the office phone number listed above.  Thank you for the referral.     Physician Signature:_______________________________Date:__________________  By signing above (or electronic signature), therapist’s plan is approved by physician       Physical Therapy: TREATMENT/PROGRESS NOTE   Patient: Tim Pickard (44 y.o. female)   Examination Date: 2025   :  1980 MRN: [id number]   Visit #: 1   Insurance Allowable Auth Needed   30 VPCY []Yes    []No  After 8th visit     Insurance: Payor: [email or web address] PLAN / Plan: [email or web address] PLAN / Product Type: *No Product type* /   Insurance ID: [id number] - (Medicaid Managed)  Secondary Insurance (if applicable):    Treatment Diagnosis:     ICD-10-CM    1. Left shoulder pain, unspecified chronicity  M25.512          Medical Diagnosis:  Adhesive capsulitis of left shoulder [M75.02]   Referring Physician: Tomeka Torres MD  PCP: Tomeka Torres MD     Plan of care signed (Y/N):     Date of Patient follow up with Physician:      Plan of Care Report: EVAL 25  POC update due: (10 visits /OR AUTH LIMITS, whichever is less)  2025                                             Medical History:  Comorbidities / Relevant Medical History: Anxiety/PTSD, hx of R shoulder adhesive capsulitis                                          Precautions/ Contra-indications:           Latex

## 2025-02-03 ENCOUNTER — HOSPITAL ENCOUNTER (OUTPATIENT)
Dept: PHYSICAL THERAPY | Age: 45
Setting detail: THERAPIES SERIES
End: 2025-02-03
Payer: COMMERCIAL

## 2025-02-06 ENCOUNTER — HOSPITAL ENCOUNTER (OUTPATIENT)
Dept: PHYSICAL THERAPY | Age: 45
Setting detail: THERAPIES SERIES
End: 2025-02-06
Payer: COMMERCIAL

## 2025-02-06 ENCOUNTER — APPOINTMENT (OUTPATIENT)
Dept: PHYSICAL THERAPY | Age: 45
End: 2025-02-06
Payer: COMMERCIAL

## 2025-02-10 ENCOUNTER — HOSPITAL ENCOUNTER (OUTPATIENT)
Dept: PHYSICAL THERAPY | Age: 45
Setting detail: THERAPIES SERIES
End: 2025-02-10
Payer: COMMERCIAL

## 2025-02-13 ENCOUNTER — HOSPITAL ENCOUNTER (OUTPATIENT)
Dept: PHYSICAL THERAPY | Age: 45
Setting detail: THERAPIES SERIES
Discharge: HOME | End: 2025-02-13
Payer: COMMERCIAL

## 2025-02-13 PROCEDURE — 97110 THERAPEUTIC EXERCISES: CPT

## 2025-02-13 PROCEDURE — 97112 NEUROMUSCULAR REEDUCATION: CPT

## 2025-02-13 NOTE — FLOWSHEET NOTE
[facility] - Outpatient Rehabilitation and Therapy: [address], [place] [address] office: [phone] fax: [phone]       Physical Therapy: TREATMENT/PROGRESS NOTE   Patient: Tim Pickard (44 y.o. female)   Examination Date: 2025   :  1980 MRN: [id number]   Visit #: 2   Insurance Allowable Auth Needed   30 VPCY []Yes    []No  After 8th visit     Insurance: Payor: [employer] PLAN / Plan: [employer] PLAN / Product Type: *No Product type* /   Insurance ID: [id number] - (Medicaid Managed)  Secondary Insurance (if applicable):    Treatment Diagnosis:     ICD-10-CM    1. Left shoulder pain, unspecified chronicity  M25.512          Medical Diagnosis:  Adhesive capsulitis of left shoulder [M75.02]   Referring Physician: Tomeka Torres MD  PCP: Tomeka Torres MD     Plan of care signed (Y/N):     Date of Patient follow up with Physician: ALEX     Plan of Care Report: EVAL 25  POC update due: (10 visits /OR AUTH LIMITS, whichever is less)  2025                                             Medical History:  Comorbidities / Relevant Medical History: Anxiety/PTSD, hx of R shoulder adhesive capsulitis                                          Precautions/ Contra-indications:           Latex allergy:  NO  Pacemaker:    NO  Contraindications for Manipulation: None  Date of Surgery: n/a  Other:    Red Flags:  None    Suicide Screening:   The patient did not verbalize a primary behavioral concern, suicidal ideation, suicidal intent, or demonstrate suicidal behaviors.    Preferred Language for Healthcare:   [x] English       [] other:    SUBJECTIVE EXAMINATION     Patient stated complaint: Patient states she has missed her appt recently as she has been sick. The shoulder is sore this morning, from sleeping on he L side does make the shoulder get \"stuck\". Minimal HEP compliance.        Test used Initial score  2025   Pain Summary VAS

## 2025-02-17 ENCOUNTER — HOSPITAL ENCOUNTER (OUTPATIENT)
Dept: PHYSICAL THERAPY | Age: 45
Setting detail: THERAPIES SERIES
End: 2025-02-17
Payer: COMMERCIAL

## 2025-02-20 ENCOUNTER — HOSPITAL ENCOUNTER (OUTPATIENT)
Dept: PHYSICAL THERAPY | Age: 45
Setting detail: THERAPIES SERIES
Discharge: HOME | End: 2025-02-20
Payer: COMMERCIAL

## 2025-02-20 PROCEDURE — 97140 MANUAL THERAPY 1/> REGIONS: CPT

## 2025-02-20 PROCEDURE — 97530 THERAPEUTIC ACTIVITIES: CPT

## 2025-02-20 PROCEDURE — 97110 THERAPEUTIC EXERCISES: CPT

## 2025-02-20 NOTE — FLOWSHEET NOTE
1-2 muscle (11873)     Aquatic Therex (00522)    Dry Needle 3+ muscle (93847)     Iontophoresis (95944)    VASO (23064)     Ultrasound (47574)    Group Therapy (09945)     Estim Attended (35840)    Canalith Repositioning (16308)     Physical Performance Test (67139)    Custom orthotic ()     Other:    Other:    Total Timed Code Tx Minutes 39' 3       Total Treatment Minutes 48'        Charge Justification:  (07518) THERAPEUTIC EXERCISE - Provided verbal/tactile cueing for activities related to strengthening, flexibility, endurance, ROM performed to prevent loss of range of motion, maintain or improve muscular strength or increase flexibility, following either an injury or surgery.   (65476) HOME EXERCISE PROGRAM - Reviewed/Progressed HEP activities related to strengthening, flexibility, endurance, ROM performed to prevent loss of range of motion, maintain or improve muscular strength or increase flexibility, following either an injury or surgery.  (53445) NEUROMUSCULAR RE-EDUCATION - Therapeutic procedure, 1 or more areas, each 15 minutes; neuromuscular reeducation of movement, balance, coordination, kinesthetic sense, posture, and/or proprioception for sitting and/or standing activities    GOALS     Patient stated goal: be able to lift arm to shave  [] Progressing: [] Met: [] Not Met: [] Adjusted    Therapist goals for Patient:   Short Term Goals: To be achieved in: 2 weeks  1Independent in HEP and progression per patient tolerance, in order to prevent re-injury.   [] Progressing: [] Met: [] Not Met: [] Adjusted  Patient will have a decrease in pain to <5/10 to facilitate improvement in movement, function, and ADLs as indicated by Functional Deficits.  [] Progressing: [] Met: [] Not Met: [] Adjusted    Long Term Goals: To be achieved in: 8 weeks  Disability index score of 20.6% or less for the Upper Extremity functional Scale to assist with reaching prior level of function with activities such as preparing

## 2025-02-27 ENCOUNTER — HOSPITAL ENCOUNTER (OUTPATIENT)
Dept: PHYSICAL THERAPY | Age: 45
Setting detail: THERAPIES SERIES
Discharge: HOME | End: 2025-02-27
Payer: COMMERCIAL

## 2025-02-27 PROCEDURE — 97140 MANUAL THERAPY 1/> REGIONS: CPT

## 2025-02-27 PROCEDURE — 97110 THERAPEUTIC EXERCISES: CPT

## 2025-02-27 PROCEDURE — 97112 NEUROMUSCULAR REEDUCATION: CPT

## 2025-03-06 ENCOUNTER — HOSPITAL ENCOUNTER (OUTPATIENT)
Dept: PHYSICAL THERAPY | Age: 45
Setting detail: THERAPIES SERIES
Discharge: HOME | End: 2025-03-06
Payer: COMMERCIAL

## 2025-03-06 PROCEDURE — 97110 THERAPEUTIC EXERCISES: CPT

## 2025-03-06 PROCEDURE — 97140 MANUAL THERAPY 1/> REGIONS: CPT

## 2025-03-06 NOTE — PLAN OF CARE
[facility] - Outpatient Rehabilitation and Therapy: [address], [place] [address] office: [phone] fax: [phone]    Physical Therapy Re-Certification Plan of Care/MD UPDATE      Dear  Dr. Torres,    We had the pleasure of treating the following patient for physical therapy services at [facility].  A summary of our findings can be found in the updated assessment below.  This includes our plan of care.  If you have any questions or concerns regarding these findings, please do not hesitate to contact me at the office phone number checked above.  Thank you for the referral.     Physician Signature:________________________________Date:__________________  By signing above (or electronic signature), therapist’s plan is approved by physician    Date Range Of Visits: 1/27-3/6  Total Visits to Date: 5  Overall Response to Treatment:   []Patient is responding well to treatment and improvement is noted with regards  to goals   []Patient should continue to improve in reasonable time if they continue HEP   []Patient has plateaued and is no longer responding to skilled PT intervention    []Patient is getting worse and would benefit from return to referring MD   []Patient unable to adhere to initial POC   [x]Other: Patient has made fair progress through this period of skilled physical therapy. Patient denies pain in about a week, though the shoulder continues to feel \"stiff\" thus limiting her ADLs. Flexion and abduction ROM have improved, through IR and ER ROM remain at a significant limitation. Patient has been educated on the importance of HEP completion while continuing 1 x week skilled PT. She will continue to be progressed with functional mobility while utilizing manual therapy in order to reduce stiffness and improve tolerance to ADLs.        Physical Therapy: TREATMENT/PROGRESS NOTE   Patient: Tim Pickard (44 y.o. female)   Examination Date: 03/06/2025

## 2025-03-13 ENCOUNTER — HOSPITAL ENCOUNTER (OUTPATIENT)
Dept: PHYSICAL THERAPY | Age: 45
Setting detail: THERAPIES SERIES
Discharge: HOME | End: 2025-03-13
Payer: COMMERCIAL

## 2025-03-13 PROCEDURE — 97140 MANUAL THERAPY 1/> REGIONS: CPT

## 2025-03-13 PROCEDURE — 97110 THERAPEUTIC EXERCISES: CPT

## 2025-03-13 NOTE — FLOWSHEET NOTE
[facility] - Outpatient Rehabilitation and Therapy: [address], [place] [address] office: [phone] fax: [phone]      Physical Therapy: TREATMENT/PROGRESS NOTE   Patient: Tim Pickard (44 y.o. female)   Examination Date: 2025   :  1980 MRN: [id number]   Visit #: 6   Insurance Allowable Auth Needed   30 VPCY []Yes    []No  After 8th visit     Insurance: Payor: [employer] PLAN / Plan: [employer] PLAN / Product Type: *No Product type* /   Insurance ID: [id number] - (Medicaid Managed)  Secondary Insurance (if applicable):    Treatment Diagnosis:     ICD-10-CM    1. Left shoulder pain, unspecified chronicity  M25.512          Medical Diagnosis:  Adhesive capsulitis of left shoulder [M75.02]   Referring Physician: Tomeka Torres MD  PCP: Tomeka Torres MD     Plan of care signed (Y/N): Y    Date of Patient follow up with Physician:      Plan of Care Report: EVAL 25 -- PN 3/6  POC update due: (10 visits /OR AUTH LIMITS, whichever is less)  2025                                             Medical History:  Comorbidities / Relevant Medical History: Anxiety/PTSD, hx of R shoulder adhesive capsulitis                                          Precautions/ Contra-indications:           Latex allergy:  NO  Pacemaker:    NO  Contraindications for Manipulation: None  Date of Surgery: n/a  Other:    Red Flags:  None    Suicide Screening:   The patient did not verbalize a primary behavioral concern, suicidal ideation, suicidal intent, or demonstrate suicidal behaviors.    Preferred Language for Healthcare:   [x] English       [] other:    SUBJECTIVE EXAMINATION     Patient stated complaint: Pt has chief complaint of R occipital muscle tightness and irritation that they feel is causing HA symptoms. Pt will have intermittent episodes of cervical tightness with mild improvements reported with cervical stretching. L shoulder \"tightness\" is

## 2025-03-20 ENCOUNTER — HOSPITAL ENCOUNTER (OUTPATIENT)
Dept: PHYSICAL THERAPY | Age: 45
Setting detail: THERAPIES SERIES
Discharge: HOME | End: 2025-03-20
Payer: COMMERCIAL

## 2025-03-20 PROCEDURE — 97140 MANUAL THERAPY 1/> REGIONS: CPT

## 2025-03-20 PROCEDURE — 97110 THERAPEUTIC EXERCISES: CPT

## 2025-03-20 NOTE — FLOWSHEET NOTE
Tissue Mobilization, Dry Needling/IASTM, Trigger Point Release, and Myofascial Release  Modalities as needed that may include: Cryotherapy  Patient education on joint protection, postural re-education, activity modification, and progression of HEP    Plan: Cont POC    Electronically Signed by Crista Cho [facility] [place],  Date: 03/20/2025   Therapist was present, directed the patient's care, made skilled judgement, and was responsible for assessment and treatment of the patient. Paul Weston, PTA [id number]     Note: Portions of this note have been templated and/or copied from initial evaluation, reassessments and prior notes for documentation efficiency.    Note: If patient does not return for scheduled/recommended follow up visits, this note will serve as a discharge from care along with the most recent update on progress.

## 2025-03-27 ENCOUNTER — HOSPITAL ENCOUNTER (OUTPATIENT)
Dept: PHYSICAL THERAPY | Age: 45
Setting detail: THERAPIES SERIES
End: 2025-03-27
Payer: COMMERCIAL

## 2025-04-03 ENCOUNTER — HOSPITAL ENCOUNTER (OUTPATIENT)
Dept: PHYSICAL THERAPY | Age: 45
Setting detail: THERAPIES SERIES
Discharge: HOME | End: 2025-04-03
Payer: COMMERCIAL

## 2025-04-03 PROCEDURE — 97530 THERAPEUTIC ACTIVITIES: CPT

## 2025-04-03 PROCEDURE — 97140 MANUAL THERAPY 1/> REGIONS: CPT

## 2025-04-03 PROCEDURE — 97110 THERAPEUTIC EXERCISES: CPT

## 2025-04-03 NOTE — PLAN OF CARE
electronically.  Refer to [employer] access code:    Access Code: [email or web address]  URL: [email or web address]  Date: 04/03/2025  Prepared by: Farzana Rosales      ASSESSMENT   Medical Necessity Documentation:  I certify that this patient meets the below criteria necessary for medical necessity for care and/or justification of therapy services:  The patient has functional impairments and/or activity limitations and would benefit from continued outpatient therapy services to address the deficits outlined in the patients goals    Treatment/Activity Tolerance:  [x] Patient tolerated treatment well [] Patient limited by fatique  [] Patient limited by pain  [] Patient limited by other medical complications  [x] Other: see above.       Return to Play: NA    Prognosis for POC: [x] Good [] Fair  [] Poor    Patient requires continued skilled intervention: [x] Yes  [] No      CHARGE CAPTURE     PT CHARGE GRID   CPT Code (TIMED) minutes # CPT Code (UNTIMED) #     Therex (50972)   1  EVAL:LOW (08413 - Typically 20 minutes face-to-face)     Neuromusc. Re-ed (46366)    Re-Eval (29444)     Manual (34228)  1  Estim Unattended (83338)     Ther. Act (32266)  1  Mech. Traction (49920)     Gait (28747)    Dry Needle 1-2 muscle (06702)     Aquatic Therex (17921)    Dry Needle 3+ muscle (20561)     Iontophoresis (77019)    VASO (10068)     Ultrasound (63336)    Group Therapy (19634)     Estim Attended (88027)    Canalith Repositioning (12458)     Physical Performance Test (72850)    Custom orthotic ()     Other:    Other:    Total Timed Code Tx Minutes 38' 3       Total Treatment Minutes 46'        Charge Justification:  (46413) THERAPEUTIC EXERCISE - Provided verbal/tactile cueing for HEP and/or activities related to strengthening, flexibility, endurance, ROM performed to prevent loss of range of motion, maintain or improve muscular strength or increase flexibility, following either an injury or surgery.   (72874) THERAPEUTIC ACTIVITY -

## 2025-04-10 ENCOUNTER — HOSPITAL ENCOUNTER (OUTPATIENT)
Dept: PHYSICAL THERAPY | Age: 45
Setting detail: THERAPIES SERIES
End: 2025-04-10
Payer: COMMERCIAL

## 2025-04-17 ENCOUNTER — OFFICE VISIT (OUTPATIENT)
Dept: ORTHOPEDIC SURGERY | Age: 45
End: 2025-04-17
Payer: COMMERCIAL

## 2025-04-17 VITALS — HEIGHT: 65 IN | BODY MASS INDEX: 23.32 KG/M2 | WEIGHT: 140 LBS

## 2025-04-17 DIAGNOSIS — M25.512 LEFT SHOULDER PAIN, UNSPECIFIED CHRONICITY: Primary | ICD-10-CM

## 2025-04-17 DIAGNOSIS — M75.02 ADHESIVE CAPSULITIS OF LEFT SHOULDER: ICD-10-CM

## 2025-04-17 DIAGNOSIS — M67.912 TENDINOPATHY OF LEFT ROTATOR CUFF: ICD-10-CM

## 2025-04-17 PROCEDURE — 1036F TOBACCO NON-USER: CPT | Performed by: FAMILY MEDICINE

## 2025-04-17 PROCEDURE — 99203 OFFICE O/P NEW LOW 30 MIN: CPT | Performed by: FAMILY MEDICINE

## 2025-04-17 PROCEDURE — G8420 CALC BMI NORM PARAMETERS: HCPCS | Performed by: FAMILY MEDICINE

## 2025-04-17 PROCEDURE — G8427 DOCREV CUR MEDS BY ELIG CLIN: HCPCS | Performed by: FAMILY MEDICINE

## 2025-04-17 RX ORDER — IBUPROFEN 800 MG/1
800 TABLET, FILM COATED ORAL EVERY 8 HOURS PRN
Qty: 90 TABLET | Refills: 3 | Status: SHIPPED | OUTPATIENT
Start: 2025-04-17 | End: 2025-05-17

## 2025-04-17 RX ORDER — METHYLPREDNISOLONE 4 MG/1
TABLET ORAL
Qty: 21 KIT | Refills: 0 | Status: SHIPPED | OUTPATIENT
Start: 2025-04-17

## 2025-04-17 NOTE — PROGRESS NOTES
Chief Complaint    Shoulder Pain (LT Shoulder Pain)    Initial consultation ongoing left shoulder pain with weakness motion loss    History of Present Illness:  Tim Pickard is a 44 y.o. female is a very pleasant right-hand-dominant white female who is coming to graduating from [employer] with a degree in organizational leadership in early May 2025 and does have a history of borderline personality disorder PTSD and is in recovery from drug addiction and is maintained on Suboxone and is a very nice patient of Dr. Tomeka Torres who is being seen today upon referral from Dr. Torres and physical therapy for evaluation of ongoing pain weakness and motion loss to her left shoulder.  Her past history is remarkable for treatment back in 2022 for adhesive capsulitis.  History of Present Illness  The patient presents for evaluation of right shoulder pain.    Persistent right shoulder pain has been experienced for approximately 6 months since fall 2020, with no history of injury, fall, or trauma. The onset of the pain was sudden, occurring upon awakening one day. The pain is described as a sharp, shooting sensation, akin to a toothache, and is accompanied by stiffness at rest. The pain intensifies when the shoulder is struck and radiates down the chest wall and through the axillary region. The pain is rated as a 7 on a scale of 1 to 10. She is right-handed. Certain movements, such as reaching out to the side or behind her back, exacerbate the discomfort. Pain management has included ibuprofen, taking 4 tablets once or twice daily, and occasionally supplementing with Tylenol 20 minutes later.  She is being seen today for orthopedic and sports consultation with initial plain film imaging.  In speaking with her physical therapist, she has had 8 sessions since 1/27/2025 and has improved somewhat with her forward flexion and abduction but is still having substantial rotational deficits.  She has been pretty good

## 2025-04-17 NOTE — PATIENT INSTRUCTIONS
If you're currently taking an anti-inflammatory such as advil, aleve, ibuprofen, diclofenac, naproxen, meloxicam, celebrex, or nabumetone, please stop.    Take Medrol first for 6 days. This is a steroid pack. Flip the package over to the foil side and the directions will tell you to start with 6 pills the first day, 5 pills the second day, etc. Please do not take any other anti-inflammatories with the medrol dose anibal as this can upset your stomach. If something else is needed, you may take extra strength tylenol.     Once you are finished with the medrol, then you may re-start or start your anti-inflammatory: 800mg ibuprofen     
Patent

## 2025-04-28 ENCOUNTER — TELEPHONE (OUTPATIENT)
Dept: ORTHOPEDIC SURGERY | Age: 45
End: 2025-04-28

## 2025-04-28 NOTE — TELEPHONE ENCOUNTER
Spoke to patient and informed them that their MRI has been authorized and that they can call and schedule scan at their convenience. Also told them that they can call and schedule a f/u with Dr. Jackson once they have MRI scheduled, leaving at least 2-3 days for our office to receive their results.

## 2025-06-19 ENCOUNTER — OFFICE VISIT (OUTPATIENT)
Dept: ORTHOPEDIC SURGERY | Age: 45
End: 2025-06-19
Payer: COMMERCIAL

## 2025-06-19 VITALS — BODY MASS INDEX: 23.32 KG/M2 | HEIGHT: 65 IN | WEIGHT: 140 LBS

## 2025-06-19 DIAGNOSIS — M75.52 SUBACROMIAL BURSITIS OF LEFT SHOULDER JOINT: ICD-10-CM

## 2025-06-19 DIAGNOSIS — M25.812 IMPINGEMENT OF LEFT SHOULDER: ICD-10-CM

## 2025-06-19 DIAGNOSIS — M19.012 LOCALIZED OSTEOARTHRITIS OF LEFT SHOULDER: ICD-10-CM

## 2025-06-19 DIAGNOSIS — M25.512 LEFT SHOULDER PAIN, UNSPECIFIED CHRONICITY: Primary | ICD-10-CM

## 2025-06-19 DIAGNOSIS — M67.912 TENDINOPATHY OF LEFT ROTATOR CUFF: ICD-10-CM

## 2025-06-19 PROCEDURE — G8427 DOCREV CUR MEDS BY ELIG CLIN: HCPCS | Performed by: FAMILY MEDICINE

## 2025-06-19 PROCEDURE — 1036F TOBACCO NON-USER: CPT | Performed by: FAMILY MEDICINE

## 2025-06-19 PROCEDURE — 99213 OFFICE O/P EST LOW 20 MIN: CPT | Performed by: FAMILY MEDICINE

## 2025-06-19 PROCEDURE — G8420 CALC BMI NORM PARAMETERS: HCPCS | Performed by: FAMILY MEDICINE

## 2025-06-19 RX ORDER — METHYLPREDNISOLONE 4 MG/1
TABLET ORAL
Qty: 21 KIT | Refills: 0 | Status: SHIPPED | OUTPATIENT
Start: 2025-06-19

## 2025-06-19 NOTE — PROGRESS NOTES
Chief Complaint    Shoulder Pain (TR MRI LEFT SHOULDER/)    FU ongoing left shoulder pain with weakness motion loss    History of Present Illness:  Tim Pickard is a 44 y.o. female is a very pleasant right-hand-dominant white female who is coming to graduating from [employer] with a degree in organizational leadership in early May 2025 and does have a history of borderline personality disorder PTSD and is in recovery from drug addiction and is maintained on Suboxone and is a very nice patient of Dr. Tomeka Torres who is being seen today upon referral from Dr. Torres and physical therapy for evaluation of ongoing pain weakness and motion loss to her left shoulder.  Her past history is remarkable for treatment back in 2022 for adhesive capsulitis.  History of Present Illness  Sumerpresents for evaluation of right shoulder pain.    Persistent right shoulder pain has been experienced for approximately 6 months since fall 2020, with no history of injury, fall, or trauma. The onset of the pain was sudden, occurring upon awakening one day. The pain is described as a sharp, shooting sensation, akin to a toothache, and is accompanied by stiffness at rest. The pain intensifies when the shoulder is struck and radiates down the chest wall and through the axillary region. The pain is rated as a 7 on a scale of 1 to 10. She is right-handed. Certain movements, such as reaching out to the side or behind her back, exacerbate the discomfort. Pain management has included ibuprofen, taking 4 tablets once or twice daily, and occasionally supplementing with Tylenol 20 minutes later.  She is being seen today for orthopedic and sports consultation with initial plain film imaging.  In speaking with her physical therapist, she has had 8 sessions since 1/27/2025 and has improved somewhat with her forward flexion and abduction but is still having substantial rotational deficits.  She has been pretty good about keeping up with

## 2025-07-02 ENCOUNTER — APPOINTMENT (OUTPATIENT)
Dept: PHYSICAL THERAPY | Age: 45
End: 2025-07-02
Payer: COMMERCIAL

## 2025-07-10 ENCOUNTER — HOSPITAL ENCOUNTER (OUTPATIENT)
Dept: PHYSICAL THERAPY | Age: 45
Setting detail: THERAPIES SERIES
Discharge: HOME | End: 2025-07-10
Payer: COMMERCIAL

## 2025-07-10 PROCEDURE — 97110 THERAPEUTIC EXERCISES: CPT

## 2025-07-10 PROCEDURE — 97161 PT EVAL LOW COMPLEX 20 MIN: CPT

## 2025-07-10 PROCEDURE — 97112 NEUROMUSCULAR REEDUCATION: CPT

## 2025-07-10 NOTE — PLAN OF CARE
flexion to 160 deg without pain to allow for proper joint functioning to enable patient to raise arm to shave.   [] Progressing: [] Met: [] Not Met: [] Adjusted  Patient will demonstrate increased Strength of RTC to 5/5 to allow for proper functional mobility to enable patient to return to carrying groceries at waist level.   [] Progressing: [] Met: [] Not Met: [] Adjusted  Patient will be able to sleep through the night without increased symptoms or restriction.   [] Progressing: [] Met: [] Not Met: [] Adjusted  Patient will be able to place 5# overhead without increased symptoms or restriction in order to put dishes away.   [] Progressing: [] Met: [] Not Met: [] Adjusted       Overall Progression Towards Functional goals/ Treatment Progress Update:  [] Patient is progressing as expected towards functional goals listed.    [] Progression is slowed due to complexities/Impairments listed.  [] Progression has been slowed due to co-morbidities.  [x] Plan just implemented, too soon (<30days) to assess goals progression   [] Goals require adjustment due to lack of progress  [] Patient is not progressing as expected and requires additional follow up with physician  [] Other:     TREATMENT PLAN     Frequency/Duration: 1x/week for 6-8 weeks for the following treatment interventions:    Interventions:  Therapeutic Exercise (00707) including: strength training, ROM, and functional mobility  Therapeutic Activities (31801) including: functional mobility training and education.  Neuromuscular Re-education (19761) activation and proprioception, including postural re-education.    Manual Therapy (13954) as indicated to include: Passive Range of Motion, Gr I-IV mobilizations, Soft Tissue Mobilization, Dry Needling/IASTM, Trigger Point Release, and Myofascial Release  Modalities as needed that may include: Cryotherapy  Patient education on joint protection, postural re-education, activity modification, and progression of

## 2025-07-16 ASSESSMENT — ENCOUNTER SYMPTOMS
EYE DISCHARGE: 0
ALLERGIC/IMMUNOLOGIC NEGATIVE: 1
SHORTNESS OF BREATH: 0
EYES NEGATIVE: 1
RESPIRATORY NEGATIVE: 1
GASTROINTESTINAL NEGATIVE: 1
ABDOMINAL PAIN: 0

## 2025-07-16 NOTE — PROGRESS NOTES
Tim Pickard (:  1980) is a 44 y.o. female,, here for evaluation of the following chief complaint(s):  No chief complaint on file.    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    H/o PTSD, bipolar disorder, Borderline personality disorder- [facility] behavior  Substance use disorder-  opoid disorder and on suboxone 3 tablets of  8-2 from [facility]       Elevated liver function test  on Depakote.  False positive hepatitis C negative hepatitis B.  Immunity to a but not to be.  Repeat liver function test are normal.     Drug addiction in remission.  Rehab.  Suboxone.  Clonidine and Vistaril.  1 child with autism.  Has a .   Dental surgery.       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  Has not yet had visual field testing.  Has seen plastic surgery    Cancer- mother breast cancer, cousins breast cancer, paternal grandmother    1. Adhesive capsulitis of left shoulder  Improving  Getting physical therapy done.  Follows orthopedic.  Refill sent on ibuprofen, use sparingly with Tylenol.  - ibuprofen (ADVIL;MOTRIN) 800 MG tablet; Take 1 tablet by mouth every 8 hours as needed for Pain  Dispense: 90 tablet; Refill: 0    2. Borderline personality disorder (HCC)  Follows a therapist, mood stable.    3. Drug addiction in remission (HCC)  Stable mood, continue with use of Suboxone.    4. Left shoulder pain, unspecified chronicity  Improving, continue with physical therapy.  Use ibuprofen and alternate with Tylenol as needed.  - ibuprofen (ADVIL;MOTRIN) 800 MG tablet; Take 1 tablet by mouth every 8 hours as needed for Pain  Dispense: 90 tablet; Refill: 0             Return in about 6 months (around 2026) for annual physical examination.       Subjective

## 2025-07-17 ENCOUNTER — OFFICE VISIT (OUTPATIENT)
Dept: INTERNAL MEDICINE CLINIC | Age: 45
End: 2025-07-17
Payer: COMMERCIAL

## 2025-07-17 VITALS
DIASTOLIC BLOOD PRESSURE: 80 MMHG | HEART RATE: 59 BPM | SYSTOLIC BLOOD PRESSURE: 122 MMHG | OXYGEN SATURATION: 99 % | BODY MASS INDEX: 22.3 KG/M2 | WEIGHT: 134 LBS

## 2025-07-17 DIAGNOSIS — M75.02 ADHESIVE CAPSULITIS OF LEFT SHOULDER: Primary | ICD-10-CM

## 2025-07-17 DIAGNOSIS — F19.21 DRUG ADDICTION IN REMISSION (HCC): ICD-10-CM

## 2025-07-17 DIAGNOSIS — M25.512 LEFT SHOULDER PAIN, UNSPECIFIED CHRONICITY: ICD-10-CM

## 2025-07-17 DIAGNOSIS — F60.3 BORDERLINE PERSONALITY DISORDER (HCC): ICD-10-CM

## 2025-07-17 PROCEDURE — 99214 OFFICE O/P EST MOD 30 MIN: CPT | Performed by: STUDENT IN AN ORGANIZED HEALTH CARE EDUCATION/TRAINING PROGRAM

## 2025-07-17 PROCEDURE — G8420 CALC BMI NORM PARAMETERS: HCPCS | Performed by: STUDENT IN AN ORGANIZED HEALTH CARE EDUCATION/TRAINING PROGRAM

## 2025-07-17 PROCEDURE — G8427 DOCREV CUR MEDS BY ELIG CLIN: HCPCS | Performed by: STUDENT IN AN ORGANIZED HEALTH CARE EDUCATION/TRAINING PROGRAM

## 2025-07-17 PROCEDURE — 1036F TOBACCO NON-USER: CPT | Performed by: STUDENT IN AN ORGANIZED HEALTH CARE EDUCATION/TRAINING PROGRAM

## 2025-07-17 PROCEDURE — G2211 COMPLEX E/M VISIT ADD ON: HCPCS | Performed by: STUDENT IN AN ORGANIZED HEALTH CARE EDUCATION/TRAINING PROGRAM

## 2025-07-17 RX ORDER — IBUPROFEN 800 MG/1
800 TABLET, FILM COATED ORAL EVERY 8 HOURS PRN
Qty: 90 TABLET | Refills: 0 | Status: SHIPPED | OUTPATIENT
Start: 2025-07-17 | End: 2025-08-16

## 2025-07-23 ENCOUNTER — APPOINTMENT (OUTPATIENT)
Dept: PHYSICAL THERAPY | Age: 45
End: 2025-07-23
Payer: COMMERCIAL

## 2025-07-30 ENCOUNTER — APPOINTMENT (OUTPATIENT)
Dept: PHYSICAL THERAPY | Age: 45
End: 2025-07-30
Payer: COMMERCIAL

## 2025-08-12 ASSESSMENT — ENCOUNTER SYMPTOMS
RESPIRATORY NEGATIVE: 1
EYE DISCHARGE: 0
ALLERGIC/IMMUNOLOGIC NEGATIVE: 1
ABDOMINAL PAIN: 0
SHORTNESS OF BREATH: 0
EYES NEGATIVE: 1
GASTROINTESTINAL NEGATIVE: 1

## 2025-08-13 ENCOUNTER — OFFICE VISIT (OUTPATIENT)
Dept: INTERNAL MEDICINE CLINIC | Age: 45
End: 2025-08-13
Payer: COMMERCIAL

## 2025-08-13 VITALS
SYSTOLIC BLOOD PRESSURE: 110 MMHG | OXYGEN SATURATION: 97 % | BODY MASS INDEX: 23.13 KG/M2 | HEART RATE: 83 BPM | DIASTOLIC BLOOD PRESSURE: 70 MMHG | WEIGHT: 139 LBS

## 2025-08-13 DIAGNOSIS — Z11.4 SCREENING FOR HIV WITHOUT PRESENCE OF RISK FACTORS: ICD-10-CM

## 2025-08-13 DIAGNOSIS — Z12.31 SCREENING MAMMOGRAM FOR BREAST CANCER: ICD-10-CM

## 2025-08-13 DIAGNOSIS — M25.512 LEFT SHOULDER PAIN, UNSPECIFIED CHRONICITY: ICD-10-CM

## 2025-08-13 DIAGNOSIS — Z00.00 ENCOUNTER FOR WELL ADULT EXAM WITHOUT ABNORMAL FINDINGS: Primary | ICD-10-CM

## 2025-08-13 PROCEDURE — 99396 PREV VISIT EST AGE 40-64: CPT | Performed by: STUDENT IN AN ORGANIZED HEALTH CARE EDUCATION/TRAINING PROGRAM

## 2025-08-13 RX ORDER — LIDOCAINE 50 MG/G
1 PATCH TOPICAL DAILY
Qty: 30 PATCH | Refills: 0 | Status: SHIPPED | OUTPATIENT
Start: 2025-08-13

## 2025-08-13 RX ORDER — LIDOCAINE 50 MG/G
1 PATCH TOPICAL DAILY
COMMUNITY
End: 2025-08-13 | Stop reason: SDUPTHER

## 2025-08-13 ASSESSMENT — ENCOUNTER SYMPTOMS
ALLERGIC/IMMUNOLOGIC NEGATIVE: 1
EYE DISCHARGE: 0
EYES NEGATIVE: 1
GASTROINTESTINAL NEGATIVE: 1
ABDOMINAL PAIN: 0
SHORTNESS OF BREATH: 0
RESPIRATORY NEGATIVE: 1